# Patient Record
Sex: FEMALE | Race: BLACK OR AFRICAN AMERICAN | Employment: FULL TIME | URBAN - METROPOLITAN AREA
[De-identification: names, ages, dates, MRNs, and addresses within clinical notes are randomized per-mention and may not be internally consistent; named-entity substitution may affect disease eponyms.]

---

## 2017-01-30 ENCOUNTER — OFFICE VISIT (OUTPATIENT)
Dept: FAMILY MEDICINE | Facility: CLINIC | Age: 47
End: 2017-01-30
Payer: COMMERCIAL

## 2017-01-30 VITALS — WEIGHT: 206 LBS | BODY MASS INDEX: 33.27 KG/M2 | SYSTOLIC BLOOD PRESSURE: 130 MMHG | DIASTOLIC BLOOD PRESSURE: 90 MMHG

## 2017-01-30 DIAGNOSIS — Z71.84 TRAVEL ADVICE ENCOUNTER: Primary | ICD-10-CM

## 2017-01-30 DIAGNOSIS — Z23 NEED FOR VACCINATION: ICD-10-CM

## 2017-01-30 PROCEDURE — 90472 IMMUNIZATION ADMIN EACH ADD: CPT | Mod: GA | Performed by: NURSE PRACTITIONER

## 2017-01-30 PROCEDURE — 99402 PREV MED CNSL INDIV APPRX 30: CPT | Mod: 25 | Performed by: NURSE PRACTITIONER

## 2017-01-30 PROCEDURE — 90471 IMMUNIZATION ADMIN: CPT | Mod: GA | Performed by: NURSE PRACTITIONER

## 2017-01-30 PROCEDURE — 90686 IIV4 VACC NO PRSV 0.5 ML IM: CPT | Mod: GA | Performed by: NURSE PRACTITIONER

## 2017-01-30 PROCEDURE — 90632 HEPA VACCINE ADULT IM: CPT | Mod: GA | Performed by: NURSE PRACTITIONER

## 2017-01-30 PROCEDURE — 90691 TYPHOID VACCINE IM: CPT | Mod: GA | Performed by: NURSE PRACTITIONER

## 2017-01-30 RX ORDER — ATOVAQUONE AND PROGUANIL HYDROCHLORIDE 250; 100 MG/1; MG/1
1 TABLET, FILM COATED ORAL DAILY
Qty: 15 TABLET | Refills: 0 | Status: SHIPPED | OUTPATIENT
Start: 2017-01-30 | End: 2017-08-04

## 2017-01-30 RX ORDER — CIPROFLOXACIN 500 MG/1
500 TABLET, FILM COATED ORAL 2 TIMES DAILY
Qty: 6 TABLET | Refills: 0 | Status: SHIPPED | OUTPATIENT
Start: 2017-01-30 | End: 2017-08-04

## 2017-01-30 NOTE — Clinical Note
Name of traveler:  Kayla Fuentes  YOB: 1970  Dates of travel; 4 February 2017- 9 February 2017  Country of Travel:  [place] to [facility] to [facility]                  Immunization Exemption Letter: Yellow Fever  The traveler named above is my patient and under my medical care and must be exempted from the requirement for yellow fever immunization. This exemption is valid only for the current trip (noted above).  The risk of the vaccination is greater than the risk of disease for this patient.         Skylar Atwood CNP

## 2017-01-30 NOTE — MR AVS SNAPSHOT
After Visit Summary   1/30/2017    Kayla Fuentes    MRN: [id number]           Patient Information     Date Of Birth          1970        Visit Information        Provider Department      1/30/2017 3:45 PM Skylar Atwood APRN CNP [facility]        Today's Diagnoses     Travel advice encounter    -  1     Need for vaccination           Care Instructions    Today January 30, 2017 you received the    Flu Vaccine    Hepatitis A Vaccine - Please return on 7/29/17 or later for your 2nd and final dose.    Typhoid - injectable. This vaccine is valid for two years.   .    These appointments can be made as a NURSE ONLY visit.    **It is very important for the vaccinations to be given on the scheduled day(s), this helps ensure you receive the full effectiveness of the vaccine.**    Please call [facility] with any questions [phone]    Thank you for visiting [facility]            Follow-ups after your visit        Who to contact     If you have questions or need follow up information about today's clinic visit or your schedule please contact [facility] directly at [phone].  Normal or non-critical lab and imaging results will be communicated to you by Tangible Playhart, letter or phone within 4 business days after the clinic has received the results. If you do not hear from us within 7 days, please contact the clinic through Tangible Playhart or phone. If you have a critical or abnormal lab result, we will notify you by phone as soon as possible.  Submit refill requests through [email or web address] or call your pharmacy and they will forward the refill request to us. Please allow 3 business days for your refill to be completed.          Additional Information About Your Visit        Tangible PlayharPongr Information     [email or web address] lets you send messages to your doctor, view your test results, renew your prescriptions, schedule appointments and more. To sign up, go to  "[email or web address] . Click on \"Log in\" on the left side of the screen, which will take you to the Welcome page. Then click on \"Sign up Now\" on the right side of the page.     You will be asked to enter the access code listed below, as well as some personal information. Please follow the directions to create your username and password.     Your access code is: [email or web address]  Expires: 2/15/2017  2:23 PM     Your access code will  in 90 days. If you need help or a new code, please call your [place] clinic or [phone].        Care EveryWhere ID     This is your Care EveryWhere ID. This could be used by other organizations to access your [place] medical records  [id number]         Blood Pressure from Last 3 Encounters:   17 130/90   16 122/87   16 116/80    Weight from Last 3 Encounters:   17 206 lb (93.441 kg)   16 200 lb (90.719 kg)   16 196 lb 8 oz (89.132 kg)              We Performed the Following     HC FLU VAC PRESRV FREE QUAD SPLIT VIR 3+YRS IM     HEPA VACCINE ADULT IM     TYPHOID VACCINE, IM          Today's Medication Changes          These changes are accurate as of: 17  4:13 PM.  If you have any questions, ask your nurse or doctor.               Start taking these medicines.        Dose/Directions    atovaquone-proguanil 250-100 MG per tablet   Commonly known as:  MALARONE   Used for:  Need for vaccination, Travel advice encounter   Started by:  Skylar Atwood APRN CNP        Dose:  1 tablet   Take 1 tablet by mouth daily Start 2 days before exposure to Malaria and continue daily till  7 days after exposure.   Quantity:  15 tablet   Refills:  0       ciprofloxacin 500 MG tablet   Commonly known as:  CIPRO   Used for:  Travel advice encounter   Started by:  Skylar Atwood APRN CNP        Dose:  500 mg   Take 1 tablet (500 mg) by mouth 2 times daily Take 1 tablet two times a day for up to 3 days for severe diarrhea during travel.   Quantity:  6 " tablet   Refills:  0            Where to get your medicines      These medications were sent to [facility]/pharmacy #[id number] - [place], [place] - [address] [place] AT [facility] OF [facility]  [address] [facility] [address]     Phone:  [phone]    - atovaquone-proguanil 250-100 MG per tablet  - ciprofloxacin 500 MG tablet             Primary Care Provider Office Phone # Fax #    Jassi Washington [phone]       [facility] [address]  [facility] [address]        Thank you!     Thank you for choosing [facility]  for your care. Our goal is always to provide you with excellent care. Hearing back from our patients is one way we can continue to improve our services. Please take a few minutes to complete the written survey that you may receive in the mail after your visit with us. Thank you!             Your Updated Medication List - Protect others around you: Learn how to safely use, store and throw away your medicines at www.disposemymeds.org.          This list is accurate as of: 1/30/17  4:13 PM.  Always use your most recent med list.                   Brand Name Dispense Instructions for use    acetaminophen-caffeine 500-65 MG Tabs    EXCEDRIN TENSION HEADACHE     Take 2 tablets by mouth every 6 hours as needed for mild pain       atovaquone-proguanil 250-100 MG per tablet    MALARONE    15 tablet    Take 1 tablet by mouth daily Start 2 days before exposure to Malaria and continue daily till  7 days after exposure.       ciprofloxacin 500 MG tablet    CIPRO    6 tablet    Take 1 tablet (500 mg) by mouth 2 times daily Take 1 tablet two times a day for up to 3 days for severe diarrhea during travel.       HYDROcodone-acetaminophen 7.5-325 MG per tablet    NORCO     Take 1 tablet by mouth 2 times daily       * IMITREX PO      Take 50 mg by mouth at onset of headache for migraine       * SUMAtriptan 50 MG tablet    IMITREX    9 tablet    Take 1 tablet  (50 mg) by mouth at onset of headache for migraine May repeat dose in 2 hours.  Do not exceed 200 mg in 24 hours       * Notice:  This list has 2 medication(s) that are the same as other medications prescribed for you. Read the directions carefully, and ask your doctor or other care provider to review them with you.

## 2017-01-30 NOTE — NURSING NOTE
"Chief Complaint   Patient presents with     Travel Clinic     initial /90 mmHg  Wt 206 lb (93.441 kg) Estimated body mass index is 33.27 kg/(m^2) as calculated from the following:    Height as of 11/17/16: 5' 6\" (1.676 m).    Weight as of this encounter: 206 lb (93.441 kg).  BP completed using cuff size: large.  L  arm      Health Maintenance that is potentially due pending provider review:  NONE    n/a    Randy Masters ma  "

## 2017-01-30 NOTE — PROGRESS NOTES
Nurse Note      Itinerary:  [facility]      Departure Date: 2/4/17      Return Date: 2/8/17      Length of Trip 4 days      Reason for Travel: Business           Urban or rural: urban      Accommodations: Hotel        IMMUNIZATION HISTORY  Have you received any immunizations within the past 4 weeks?  No  Have you ever fainted from having your blood drawn or from an injection?  No  Have you ever had a fever reaction to vaccination?  No  Have you ever had any bad reaction or side effect from any vaccination?  No  Have you ever had hepatitis A or B vaccine?  No  Do you live (or work closely) with anyone who has AIDS, an AIDS-like condition, any other immune disorder or who is on chemotherapy for cancer?  No  Do you have a family history of immunodeficiency?  No  Have you received any injection of immune globulin or any blood products during the past 12 months?  No    Patient roomed by Randy Kaiser  Kayla Fuentes is a 46 year old female seen today alone for counsultation for international travel to [facility] for Business.  Patient will be departing in  4 day(s) and staying for   5 day(s) and  traveling with co-worker(s).      Patient itinerary :  will be in the urban region of [facility] which presents risk for Malaria, Dengue Fever, Chikungungya, Zika, Rabies, food borne illnesses, motor vehicle accidents and Typhoid. exposure.      Patient's activities will include business.    Patient[place] birth [place]    Special medical concerns: obesity and hypertension  Pre-travel questionnaire was completed by patient and reviewed by provider.     Vitals: /90 mmHg  Wt 206 lb (93.441 kg)  BMI= Body mass index is 33.27 kg/(m^2).    EXAM:  General:  Well-nourished, well-developed in no acute distress.  Appears to be stated age, interacts appropriately and expresses understanding of information given to patient.    Current Outpatient Prescriptions   Medication Sig Dispense Refill     SUMAtriptan (IMITREX)  50 MG tablet Take 1 tablet (50 mg) by mouth at onset of headache for migraine May repeat dose in 2 hours.  Do not exceed 200 mg in 24 hours 9 tablet 1     HYDROcodone-acetaminophen (NORCO) 7.5-325 MG per tablet Take 1 tablet by mouth 2 times daily       acetaminophen-caffeine (EXCEDRIN TENSION HEADACHE) 500-65 MG TABS Take 2 tablets by mouth every 6 hours as needed for mild pain       SUMAtriptan Succinate (IMITREX PO) Take 50 mg by mouth at onset of headache for migraine       Patient Active Problem List   Diagnosis     Hypertension goal BP (blood pressure) < 140/90     Intractable migraine with aura without status migrainosus     Chronic low back pain     CARDIOVASCULAR SCREENING; LDL GOAL LESS THAN 160     TMJ (temporomandibular joint syndrome)     Obesity (BMI 30-39.9)     Microalbuminuria     Acute right flank pain     History of renal calculi     Allergies   Allergen Reactions     Sulfa Drugs GI Disturbance         Immunizations discussed include:   Hepatitis A:  Ordered/given today, risks, benefits and side effects reviewed  Hepatitis B: Insufficient time to vaccinate  Influenza: Ordered/given today, risks, benefits and side effects reviewed  Typhoid: Ordered/given today, risks, benefits and side effects reviewed  Rabies: Insufficient time to vaccinate  Yellow Fever: low risk waiver given  Japanese Encephalitis: Not indicated  Meningococcus: Not indicated  Tetanus/Diphtheria: Up to date  Measles/Mumps/Rubella: Up to date  Cholera: Not needed  Polio: Up to date  Pneumococcal: Under age of 65  Varicella: Immune by disease history per patient report  Zostavax:  Not indicated  HPV:  Not indicated  TB:  Low risk    Altitude Exposure on this trip: no    ASSESSMENT/PLAN:    ICD-10-CM    1. Travel advice encounter Z71.89 HEPA VACCINE ADULT IM     TYPHOID VACCINE, IM     HC FLU VAC PRESRV FREE QUAD SPLIT VIR 3+YRS IM     atovaquone-proguanil (MALARONE) 250-100 MG per tablet     ciprofloxacin (CIPRO) 500 MG tablet   2.  Need for vaccination Z23 HEPA VACCINE ADULT IM     TYPHOID VACCINE, IM     HC FLU VAC PRESRV FREE QUAD SPLIT VIR 3+YRS IM     atovaquone-proguanil (MALARONE) 250-100 MG per tablet     I have reviewed general recommendations for safe travel   including: food/water precautions, insect precautions, safer sex   practices given high prevalence of Zika, HIV and other STDs,   roadway safety. Educational materials and [employer] report provided.    Malaraia prophylaxis recommended: Malarone  Symptomatic treatment for traveler's diarrhea: ciprofloxacin  Altitude illness prevention and treatment: no      Evacuation insurance advised and resources were provided to patient.    Total visit time 30 minutes  with over 50% of time spent counseling patient as detailed above.    Skylar Atwood CNP

## 2017-01-30 NOTE — PATIENT INSTRUCTIONS
Today January 30, 2017 you received the    Flu Vaccine    Hepatitis A Vaccine - Please return on 7/29/17 or later for your 2nd and final dose.    Typhoid - injectable. This vaccine is valid for two years.   .    These appointments can be made as a NURSE ONLY visit.    **It is very important for the vaccinations to be given on the scheduled day(s), this helps ensure you receive the full effectiveness of the vaccine.**    Please call [facility] with any questions [phone]    Thank you for visiting [facility]

## 2017-01-31 DIAGNOSIS — G43.119 INTRACTABLE MIGRAINE WITH AURA WITHOUT STATUS MIGRAINOSUS: Primary | ICD-10-CM

## 2017-01-31 NOTE — Clinical Note
February 10, 2017      Kayla Fuentes  [address] [address] [place] [id number]         Dear Kayla Fuentes,      The refill request made by your pharmacy was received at the clinic.     Signed Prescriptions:                        Disp   Refills    SUMAtriptan (IMITREX) 50 MG tablet         9 tabl*0        Sig: TAKE 1 TABLET BY MOUTH AT ONSET OF MIGRAINE. MAY           REPEAT DOSE IN 2 HRS. *MAX 200MG/24 HRS*  Authorizing Provider: LILA MARCANO    At this time you are due in the clinic for a follow up appointment for a physical and medication recheck in March 2017. A one time dario refill has been sent to your pharmacy.     Please call your clinic to make an appointment with your provider before you run out of medication. This will prevent a delay in your next month's refill.    [facility]- [phone]    For your convenience we also offer online appointment scheduling at "[email or web address] or [email or web address].     We invite you to refill your next prescription at a [facility] or take advantage of our prescription mail service.    Sincerely,    Sumeet Henry CMA

## 2017-02-08 RX ORDER — SUMATRIPTAN 50 MG/1
TABLET, FILM COATED ORAL
Qty: 9 TABLET | Refills: 0 | Status: SHIPPED | OUTPATIENT
Start: 2017-02-08 | End: 2017-08-04

## 2017-02-08 NOTE — TELEPHONE ENCOUNTER
SUMAtriptan (IMITREX) 50 MG tablet      Last Written Prescription Date: 4/25/16  Last Fill Quantity: 9, # refills: 1  Last Office Visit with [facility], [facility] or [facility] prescribing provider: 4/25/16       BP Readings from Last 3 Encounters:   01/30/17 130/90   11/17/16 122/87   04/25/16 116/80

## 2017-02-09 NOTE — TELEPHONE ENCOUNTER
Patient Contact    Attempt # 1    Was call answered?  No.  Left message for patient stating prescription refill has been sent to the pharmacy. Advised patient to call clinic to schedule physical/med check appointment in March. Clinic number given.  Sumeet Henry, CMA

## 2017-02-09 NOTE — TELEPHONE ENCOUNTER
Attempt #2:  Left message for patient stating prescription has been sent to the pharmacy. Advised patient to call clinic to schedule annual physical/medication recheck appointment in 03/2017. Clinic number given.  Marta Joshua CMA

## 2017-03-15 ENCOUNTER — TRANSFERRED RECORDS (OUTPATIENT)
Dept: HEALTH INFORMATION MANAGEMENT | Facility: CLINIC | Age: 47
End: 2017-03-15

## 2017-03-15 LAB
HPV ABSTRACT: NORMAL
PAP-ABSTRACT: NORMAL

## 2017-06-14 ENCOUNTER — TRANSFERRED RECORDS (OUTPATIENT)
Dept: HEALTH INFORMATION MANAGEMENT | Facility: CLINIC | Age: 47
End: 2017-06-14

## 2017-08-04 ENCOUNTER — OFFICE VISIT (OUTPATIENT)
Dept: PEDIATRICS | Facility: CLINIC | Age: 47
End: 2017-08-04
Payer: COMMERCIAL

## 2017-08-04 VITALS
BODY MASS INDEX: 31.81 KG/M2 | WEIGHT: 197.9 LBS | SYSTOLIC BLOOD PRESSURE: 108 MMHG | DIASTOLIC BLOOD PRESSURE: 70 MMHG | TEMPERATURE: 98.5 F | HEART RATE: 75 BPM | HEIGHT: 66 IN | OXYGEN SATURATION: 98 %

## 2017-08-04 DIAGNOSIS — Z12.31 VISIT FOR SCREENING MAMMOGRAM: ICD-10-CM

## 2017-08-04 DIAGNOSIS — G43.119 INTRACTABLE MIGRAINE WITH AURA WITHOUT STATUS MIGRAINOSUS: ICD-10-CM

## 2017-08-04 DIAGNOSIS — Z00.00 ROUTINE GENERAL MEDICAL EXAMINATION AT A HEALTH CARE FACILITY: Primary | ICD-10-CM

## 2017-08-04 DIAGNOSIS — Z13.6 CARDIOVASCULAR SCREENING; LDL GOAL LESS THAN 160: ICD-10-CM

## 2017-08-04 DIAGNOSIS — Z13.29 SCREENING FOR THYROID DISORDER: ICD-10-CM

## 2017-08-04 DIAGNOSIS — M54.50 CHRONIC BILATERAL LOW BACK PAIN WITHOUT SCIATICA: ICD-10-CM

## 2017-08-04 DIAGNOSIS — R74.8 ELEVATED LIVER ENZYMES: ICD-10-CM

## 2017-08-04 DIAGNOSIS — R80.9 MICROALBUMINURIA: ICD-10-CM

## 2017-08-04 DIAGNOSIS — M26.609 TMJ (TEMPOROMANDIBULAR JOINT SYNDROME): ICD-10-CM

## 2017-08-04 DIAGNOSIS — E66.9 OBESITY (BMI 30-39.9): ICD-10-CM

## 2017-08-04 DIAGNOSIS — G89.29 CHRONIC BILATERAL LOW BACK PAIN WITHOUT SCIATICA: ICD-10-CM

## 2017-08-04 DIAGNOSIS — I10 HYPERTENSION GOAL BP (BLOOD PRESSURE) < 140/90: ICD-10-CM

## 2017-08-04 PROCEDURE — 99396 PREV VISIT EST AGE 40-64: CPT | Performed by: FAMILY MEDICINE

## 2017-08-04 RX ORDER — AMLODIPINE AND BENAZEPRIL HYDROCHLORIDE 5; 10 MG/1; MG/1
1 CAPSULE ORAL DAILY
Qty: 90 CAPSULE | Refills: 3 | Status: SHIPPED | OUTPATIENT
Start: 2017-08-04 | End: 2018-05-16

## 2017-08-04 RX ORDER — AMLODIPINE AND BENAZEPRIL HYDROCHLORIDE 5; 10 MG/1; MG/1
1 CAPSULE ORAL DAILY
COMMUNITY
Start: 2017-08-04 | End: 2017-08-04

## 2017-08-04 RX ORDER — TIMOLOL MALEATE 5 MG/ML
1 SOLUTION/ DROPS OPHTHALMIC DAILY
Refills: 3 | COMMUNITY
Start: 2017-07-20 | End: 2019-06-03

## 2017-08-04 RX ORDER — SUMATRIPTAN 50 MG/1
TABLET, FILM COATED ORAL
Qty: 9 TABLET | Refills: 3 | Status: SHIPPED | OUTPATIENT
Start: 2017-08-04 | End: 2018-11-26

## 2017-08-04 ASSESSMENT — PAIN SCALES - GENERAL: PAINLEVEL: SEVERE PAIN (7)

## 2017-08-04 NOTE — MR AVS SNAPSHOT
After Visit Summary   8/4/2017    Kayla Fuentes    MRN: [id number]           Patient Information     Date Of Birth          1970        Visit Information        Provider Department      8/4/2017 3:20 PM Jassi Washington MD [facility]        Today's Diagnoses     Routine general medical examination at a health care facility    -  1    Intractable migraine with aura without status migrainosus        Visit for screening mammogram        CARDIOVASCULAR SCREENING; LDL GOAL LESS THAN 160        TMJ (temporomandibular joint syndrome)        Hypertension goal BP (blood pressure) < 140/90        Obesity (BMI 30-39.9)        Microalbuminuria        Elevated liver enzymes          Care Instructions    ZAK form clinic [facility] for pap report  Schedule for fasting labs in 1-2 weeks  Schedule for mammogram in 11/2017    Preventive Health Recommendations  Female Ages 40 to 49    Yearly exam:     See your health care provider every year in order to  1. Review health changes.   2. Discuss preventive care.    3. Review your medicines if your doctor prescribed any.      Get a Pap test every three years (unless you have an abnormal result and your provider advises testing more often).      If you get Pap tests with HPV test, you only need to test every 5 years, unless you have an abnormal result. You do not need a Pap test if your uterus was removed (hysterectomy) and you have not had cancer.      You should be tested each year for STDs (sexually transmitted diseases), if you're at risk.       Ask your doctor if you should have a mammogram.      Have a colonoscopy (test for colon cancer) if someone in your family has had colon cancer or polyps before age 50.       Have a cholesterol test every 5 years.       Have a diabetes test (fasting glucose) after age 45. If you are at risk for diabetes, you should have this test every 3 years.    Shots: Get a flu shot each year. Get a tetanus shot every 10  years.     Nutrition:     Eat at least 5 servings of fruits and vegetables each day.    Eat whole-grain bread, whole-wheat pasta and brown rice instead of white grains and rice.    Talk to your provider about Calcium and Vitamin D.     Lifestyle    Exercise at least 150 minutes a week (an average of 30 minutes a day, 5 days a week). This will help you control your weight and prevent disease.    Limit alcohol to one drink per day.    No smoking.     Wear sunscreen to prevent skin cancer.    See your dentist every six months for an exam and cleaning.          Follow-ups after your visit        Future tests that were ordered for you today     Open Future Orders        Priority Expected Expires Ordered    MA Screening Digital Bilateral Routine  8/4/2018 8/4/2017            Who to contact     If you have questions or need follow up information about today's clinic visit or your schedule please contact [facility] directly at [phone].  Normal or non-critical lab and imaging results will be communicated to you by Tap.Mehart, letter or phone within 4 business days after the clinic has received the results. If you do not hear from us within 7 days, please contact the clinic through Tap.Mehart or phone. If you have a critical or abnormal lab result, we will notify you by phone as soon as possible.  Submit refill requests through [facility] or call your pharmacy and they will forward the refill request to us. Please allow 3 business days for your refill to be completed.          Additional Information About Your Visit        Tap.[employer] Information     [facility] is an electronic gateway that provides easy, online access to your medical records. With [facility], you can request a clinic appointment, read your test results, renew a prescription or communicate with your care team.     To sign up for [facility] visit the website at [email or web address]   You will be asked to enter the access code listed below, as well as  "some personal information. Please follow the directions to create your username and password.     Your access code is: [email or web address]  Expires: 2017  4:02 PM     Your access code will  in 90 days. If you need help or a new code, please contact your [facility] or call [phone] for assistance.        Care EveryWhere ID     This is your Care EveryWhere ID. This could be used by other organizations to access your [place] medical records  [id number]        Your Vitals Were     Pulse Temperature Height Last Period Pulse Oximetry BMI (Body Mass Index)    75 98.5  F (36.9  C) (Oral) 5' 6\" (1.676 m) 2017 (Exact Date) 98% 31.94 kg/m2       Blood Pressure from Last 3 Encounters:   17 108/70   17 130/90   16 122/87    Weight from Last 3 Encounters:   17 197 lb 14.4 oz (89.8 kg)   17 206 lb (93.4 kg)   16 200 lb (90.7 kg)                 Today's Medication Changes          These changes are accurate as of: 17  4:02 PM.  If you have any questions, ask your nurse or doctor.               These medicines have changed or have updated prescriptions.        Dose/Directions    SUMAtriptan 50 MG tablet   Commonly known as:  IMITREX   This may have changed:  See the new instructions.   Used for:  Intractable migraine with aura without status migrainosus   Changed by:  Jassi Washington MD        TAKE 1 TABLET BY MOUTH AT ONSET OF MIGRAINE. MAY REPEAT DOSE IN 2 HRS. *MAX 200MG/24 HRS*   Quantity:  9 tablet   Refills:  3            Where to get your medicines      These medications were sent to [facility]/pharmacy #[id number] - [place], [place] - [address] [place] [place] AT [address], [facility] [place] [address]     Phone:  [phone]     amLODIPine-benazepril 5-10 MG per capsule    SUMAtriptan 50 MG tablet                Primary Care Provider Office Phone # Fax #    Jassi Washington [phone]       [facility] " [facility] [address]  [facility] [address]        Equal Access to Services     PAULLILIANA ELIZA : Hadii aad ku hadnegritao Soomaali, waaxda luqadaha, qaybta kaalmada adeegyada, toño remediosin hayaaneha dongcole otero laRuhunter . So [facility] [phone].    ATENCIÓN: Si habla español, tiene a melo disposición servicios gratuitos de asistencia lingüística. Llame al [phone].    We comply with applicable federal civil rights laws and [place] laws. We do not discriminate on the basis of race, color, national origin, age, disability sex, sexual orientation or gender identity.            Thank you!     Thank you for choosing [facility]  for your care. Our goal is always to provide you with excellent care. Hearing back from our patients is one way we can continue to improve our services. Please take a few minutes to complete the written survey that you may receive in the mail after your visit with us. Thank you!             Your Updated Medication List - Protect others around you: Learn how to safely use, store and throw away your medicines at www.disposemymeds.org.          This list is accurate as of: 8/4/17  4:02 PM.  Always use your most recent med list.                   Brand Name Dispense Instructions for use Diagnosis    acetaminophen-caffeine 500-65 MG Tabs    EXCEDRIN TENSION HEADACHE     Take 2 tablets by mouth every 6 hours as needed for mild pain        amLODIPine-benazepril 5-10 MG per capsule    LOTREL    90 capsule    Take 1 capsule by mouth daily    Hypertension goal BP (blood pressure) < 140/90       BENADRYL PO      Take 25 mg by mouth daily as needed for allergies        HYDROcodone-acetaminophen 7.5-325 MG per tablet    NORCO     Take 1 tablet by mouth 2 times daily        SUMAtriptan 50 MG tablet    IMITREX    9 tablet    TAKE 1 TABLET BY MOUTH AT ONSET OF MIGRAINE. MAY REPEAT DOSE IN 2 HRS. *MAX 200MG/24 HRS*    Intractable migraine with aura without status migrainosus       VIENVA  0.1-20 MG-MCG per tablet   Generic drug:  levonorgestrel-ethinyl estradiol      Take 1 tablet by mouth daily

## 2017-08-04 NOTE — Clinical Note
Please abstract the following data from this visit with this patient into the appropriate field in Epic:  Pap smear done on this date: 7/2017 (approximately), by this group: christy edwards, results were normal.

## 2017-08-04 NOTE — PATIENT INSTRUCTIONS
[facility] for pap report  Schedule for fasting labs in 1-2 weeks  Schedule for mammogram in 11/2017    Preventive Health Recommendations  Female Ages 40 to 49    Yearly exam:     See your health care provider every year in order to  1. Review health changes.   2. Discuss preventive care.    3. Review your medicines if your doctor prescribed any.      Get a Pap test every three years (unless you have an abnormal result and your provider advises testing more often).      If you get Pap tests with HPV test, you only need to test every 5 years, unless you have an abnormal result. You do not need a Pap test if your uterus was removed (hysterectomy) and you have not had cancer.      You should be tested each year for STDs (sexually transmitted diseases), if you're at risk.       Ask your doctor if you should have a mammogram.      Have a colonoscopy (test for colon cancer) if someone in your family has had colon cancer or polyps before age 50.       Have a cholesterol test every 5 years.       Have a diabetes test (fasting glucose) after age 45. If you are at risk for diabetes, you should have this test every 3 years.    Shots: Get a flu shot each year. Get a tetanus shot every 10 years.     Nutrition:     Eat at least 5 servings of fruits and vegetables each day.    Eat whole-grain bread, whole-wheat pasta and brown rice instead of white grains and rice.    Talk to your provider about Calcium and Vitamin D.     Lifestyle    Exercise at least 150 minutes a week (an average of 30 minutes a day, 5 days a week). This will help you control your weight and prevent disease.    Limit alcohol to one drink per day.    No smoking.     Wear sunscreen to prevent skin cancer.    See your dentist every six months for an exam and cleaning.

## 2017-08-04 NOTE — NURSING NOTE
"Chief Complaint   Patient presents with     Physical       Initial /70  Pulse 75  Temp 98.5  F (36.9  C) (Oral)  Ht 5' 6\" (1.676 m)  Wt 197 lb 14.4 oz (89.8 kg)  LMP 08/03/2017 (Exact Date)  SpO2 98%  BMI 31.94 kg/m2 Estimated body mass index is 31.94 kg/(m^2) as calculated from the following:    Height as of this encounter: 5' 6\" (1.676 m).    Weight as of this encounter: 197 lb 14.4 oz (89.8 kg).  BP completed using cuff size: karen Henry CMA    "

## 2017-08-04 NOTE — PROGRESS NOTES
SUBJECTIVE:   CC: Kayla Fuentes is an 46 year old woman who presents for preventive health visit.     Healthy Habits:    Do you get at least three servings of calcium containing foods daily (dairy, green leafy vegetables, etc.)? yes    Amount of exercise or daily activities, outside of work: 3 day(s) per week    Problems taking medications regularly No    Medication side effects: No    Have you had an eye exam in the past two years? yes    Do you see a dentist twice per year? yes    Do you have sleep apnea, excessive snoring or daytime drowsiness?no          Hypertension Follow-up      Outpatient blood pressures are not being checked.    Low Salt Diet: no added salt    Migraine Follow-Up    Headaches symptoms:  Stable     Frequency: once a month     Duration of headaches: hours    Able to do normal daily activities/work with migraines: Yes    Rescue/Relief medication:sumatriptan (Imitrex)              Effectiveness: moderate relief    Preventative medication: None    Neurologic complications: No new stroke-like symptoms, loss of vision or speech, numbness or weakness    In the past 4 weeks, how often have you gone to Urgent Care or the emergency room because of your headaches?  0            Today's PHQ-2 Score:   PHQ-2 ( 1999 Pf[employer]) 8/4/2017 11/17/2016   Q1: Little interest or pleasure in doing things 0 0   Q2: Feeling down, depressed or hopeless 0 0   PHQ-2 Score 0 0         Abuse: Current or Past(Physical, Sexual or Emotional)- No  Do you feel safe in your environment - Yes  Social History   Substance Use Topics     Smoking status: Former Smoker     Quit date: 3/15/2015     Smokeless tobacco: Never Used     Alcohol use Yes     The patient does not drink >3 drinks per day nor >7 drinks per week.    Reviewed orders with patient.  Reviewed health maintenance and updated orders accordingly - Yes  Labs reviewed in EPIC  BP Readings from Last 3 Encounters:   08/04/17 108/70   01/30/17 130/90   11/17/16 122/87    Wt  Readings from Last 3 Encounters:   08/04/17 197 lb 14.4 oz (89.8 kg)   01/30/17 206 lb (93.4 kg)   11/17/16 200 lb (90.7 kg)                  Patient Active Problem List   Diagnosis     Hypertension goal BP (blood pressure) < 140/90     Intractable migraine with aura without status migrainosus     Chronic low back pain     CARDIOVASCULAR SCREENING; LDL GOAL LESS THAN 160     TMJ (temporomandibular joint syndrome)     Obesity (BMI 30-39.9)     Microalbuminuria     Acute right flank pain     History of renal calculi     Elevated liver enzymes     History reviewed. No pertinent surgical history.    Social History   Substance Use Topics     Smoking status: Former Smoker     Quit date: 3/15/2015     Smokeless tobacco: Never Used     Alcohol use Yes     History reviewed. No pertinent family history.      Current Outpatient Prescriptions   Medication Sig Dispense Refill     VIENVA 0.1-20 MG-MCG per tablet Take 1 tablet by mouth daily  3     DiphenhydrAMINE HCl (BENADRYL PO) Take 25 mg by mouth daily as needed for allergies       SUMAtriptan (IMITREX) 50 MG tablet TAKE 1 TABLET BY MOUTH AT ONSET OF MIGRAINE. MAY REPEAT DOSE IN 2 HRS. *MAX 200MG/24 HRS* 9 tablet 3     amLODIPine-benazepril (LOTREL) 5-10 MG per capsule Take 1 capsule by mouth daily 90 capsule 3     acetaminophen-caffeine (EXCEDRIN TENSION HEADACHE) 500-65 MG TABS Take 2 tablets by mouth every 6 hours as needed for mild pain       [DISCONTINUED] amLODIPine-benazepril (LOTREL) 5-10 MG per capsule Take 1 capsule by mouth daily       HYDROcodone-acetaminophen (NORCO) 7.5-325 MG per tablet Take 1 tablet by mouth 2 times daily       Allergies   Allergen Reactions     Sulfa Drugs GI Disturbance     Recent Labs   Lab Test  11/17/16   1427  04/25/16   0738 02/27/14 04/16/12   LDL   --   148*  156   --    --    HDL   --   44*  50   --    --    TRIG   --   114  90   --    --    ALT  80*   --   11   --    --    CR  0.98  0.91  1.00   < >   --    GFRESTIMATED  61  67  69  "  < >   --    GFRESTBLACK  74  81  80   < >   --    POTASSIUM  4.4  4.0  4.0   < >   --    TSH   --    --   2.15   --   2.13    < > = values in this interval not displayed.              Patient under age 50, mutual decision reflected in health maintenance.        Pertinent mammograms are reviewed under the imaging tab.  History of abnormal Pap smear: NO - age 30- 65 PAP every 3 years recommended    Reviewed and updated as needed this visit by clinical staffTobacco  Allergies  Meds  Med Hx  Surg Hx  Fam Hx  Soc Hx        Reviewed and updated as needed this visit by Provider          History reviewed. No pertinent past medical history.   History reviewed. No pertinent surgical history.  Obstetric History     No data available            ROS:  C: NEGATIVE for fever, chills, change in weight  I: NEGATIVE for worrisome rashes, moles or lesions  E: NEGATIVE for vision changes or irritation  ENT: NEGATIVE for ear, mouth and throat problems  R: NEGATIVE for significant cough or SOB  B: NEGATIVE for masses, tenderness or discharge  CV: NEGATIVE for chest pain, palpitations or peripheral edema  CV: Hx HTN  GI: NEGATIVE for nausea, abdominal pain, heartburn, or change in bowel habits  : NEGATIVE for unusual urinary or vaginal symptoms. Periods are regular.  MUSCULOSKELETAL:History of chronic low back pain  N: NEGATIVE for weakness, dizziness or paresthesias  NEURO: Hx headaches-migraine  E: NEGATIVE for temperature intolerance, skin/hair changes  H: NEGATIVE for bleeding problems  P: NEGATIVE for changes in mood or affect    OBJECTIVE:   /70  Pulse 75  Temp 98.5  F (36.9  C) (Oral)  Ht 5' 6\" (1.676 m)  Wt 197 lb 14.4 oz (89.8 kg)  LMP 08/03/2017 (Exact Date)  SpO2 98%  BMI 31.94 kg/m2  EXAM:  GENERAL: healthy, alert and no distress  EYES: Eyes grossly normal to inspection, PERRL and conjunctivae and sclerae normal  HENT: ear canals and TM's normal, nose and mouth without ulcers or lesions  NECK: no " adenopathy, no asymmetry, masses, or scars and thyroid normal to palpation  RESP: lungs clear to auscultation - no rales, rhonchi or wheezes  BREAST: Deferred, patient was just seen by Dr. West at [facility] 2 weeks ago  CV: regular rate and rhythm, normal S1 S2, no S3 or S4, no murmur, click or rub, no peripheral edema and peripheral pulses strong  ABDOMEN: soft, nontender, no hepatosplenomegaly, no masses and bowel sounds normal   (female): Deferred, patient was just seen by Dr. West at [facility] 2 weeks ago  MS: no gross musculoskeletal defects noted, no edema  SKIN: no suspicious lesions or rashes  NEURO: Normal strength and tone, mentation intact and speech normal  PSYCH: mentation appears normal, affect normal/bright    ASSESSMENT/PLAN:   1. Routine general medical examination at a health care facility  Discussed on regular exercises, daily calcium intake, healthy eating, self breast exams monthly and routine dental checks  - TSH with free T4 reflex; Future    2. Intractable migraine with aura without status migrainosus  stable, continue with Imitrex p.r.n., white potential triggers for migraine  - SUMAtriptan (IMITREX) 50 MG tablet; TAKE 1 TABLET BY MOUTH AT ONSET OF MIGRAINE. MAY REPEAT DOSE IN 2 HRS. *MAX 200MG/24 HRS*  Dispense: 9 tablet; Refill: 3    3. Visit for screening mammogram    - MA Screening Digital Bilateral; Future    4. CARDIOVASCULAR SCREENING; LDL GOAL LESS THAN 160    - **Lipid panel reflex to direct LDL FUTURE anytime; Future      5. TMJ (temporomandibular joint syndrome)  Continue TMJ rehabilitation exercises    6. Hypertension goal BP (blood pressure) < 140/90  BP Readings from Last 6 Encounters:   08/04/17 108/70   01/30/17 130/90   11/17/16 122/87   04/25/16 116/80   10/13/15 108/83   05/26/15 113/87     Blood pressure is at goal, continue with the Lotrel, Will follow low salt diet, weight loss and regular exercises.   recheck in one year or sooner if needed  -  "amLODIPine-benazepril (LOTREL) 5-10 MG per capsule; Take 1 capsule by mouth daily  Dispense: 90 capsule; Refill: 3  - **Comprehensive metabolic panel FUTURE anytime; Future  - **Albumin Random Urine Quant FUTURE anytime; Future    7. Obesity (BMI 30-39.9)  Emphasized on weight loss, portion control, low calorie and low fat diet, healthy eating, regular exercises.      8. Microalbuminuria  - **Albumin Random Urine Quant FUTURE anytime; Future    9. Elevated liver enzymes  We will recheck  Reviewed abdominal CT from 11/2016 showing normal gallbladder and a benign hepatic cyst  - **Comprehensive metabolic panel FUTURE anytime; Future    10. Screening for thyroid disorder    - TSH with free T4 reflex; Future    COUNSELING:   Reviewed preventive health counseling, as reflected in patient instructions  Special attention given to:        Regular exercise       Healthy diet/nutrition       Contraception       Family planning       Folic Acid Counseling       Osteoporosis Prevention/Bone Health    11.Chronic low back pain-patient is planning for lumbar fusion from her spinal surgeon   is currently on chronic narcotic for Pain management     reports that she quit smoking about 2 years ago. She has never used smokeless tobacco.    Estimated body mass index is 31.94 kg/(m^2) as calculated from the following:    Height as of this encounter: 5' 6\" (1.676 m).    Weight as of this encounter: 197 lb 14.4 oz (89.8 kg).   Weight management plan: Discussed healthy diet and exercise guidelines and patient will follow up in 6 months in clinic to re-evaluate.    Counseling Resources:  ATP IV Guidelines  Pooled Cohorts Equation Calculator  Breast Cancer Risk Calculator  FRAX Risk Assessment  ICSI Preventive Guidelines  Dietary Guidelines for Americans, 2010  USDA's MyPlate  ASA Prophylaxis  Lung CA Screening    Jassi Washington MD  [facility]  Chart documentation done in part with Dragon Voice recognition Software. " Although reviewed after completion, some word and grammatical error may remain.

## 2017-08-08 DIAGNOSIS — Z13.29 SCREENING FOR THYROID DISORDER: ICD-10-CM

## 2017-08-08 DIAGNOSIS — Z13.6 CARDIOVASCULAR SCREENING; LDL GOAL LESS THAN 160: ICD-10-CM

## 2017-08-08 DIAGNOSIS — R74.8 ELEVATED LIVER ENZYMES: ICD-10-CM

## 2017-08-08 DIAGNOSIS — I10 HYPERTENSION GOAL BP (BLOOD PRESSURE) < 140/90: ICD-10-CM

## 2017-08-08 DIAGNOSIS — Z00.00 ROUTINE GENERAL MEDICAL EXAMINATION AT A HEALTH CARE FACILITY: ICD-10-CM

## 2017-08-08 PROBLEM — E78.5 HYPERLIPIDEMIA LDL GOAL <160: Status: ACTIVE | Noted: 2017-08-08

## 2017-08-08 PROBLEM — R73.01 ELEVATED FASTING BLOOD SUGAR: Status: ACTIVE | Noted: 2017-08-08

## 2017-08-08 LAB
ALBUMIN SERPL-MCNC: 3.5 G/DL (ref 3.4–5)
ALP SERPL-CCNC: 60 U/L (ref 40–150)
ALT SERPL W P-5'-P-CCNC: 23 U/L (ref 0–50)
ANION GAP SERPL CALCULATED.3IONS-SCNC: 7 MMOL/L (ref 3–14)
AST SERPL W P-5'-P-CCNC: 9 U/L (ref 0–45)
BILIRUB SERPL-MCNC: 0.3 MG/DL (ref 0.2–1.3)
BUN SERPL-MCNC: 12 MG/DL (ref 7–30)
CALCIUM SERPL-MCNC: 8.7 MG/DL (ref 8.5–10.1)
CHLORIDE SERPL-SCNC: 104 MMOL/L (ref 94–109)
CHOLEST SERPL-MCNC: 246 MG/DL
CO2 SERPL-SCNC: 28 MMOL/L (ref 20–32)
CREAT SERPL-MCNC: 0.96 MG/DL (ref 0.52–1.04)
CREAT UR-MCNC: 252 MG/DL
GFR SERPL CREATININE-BSD FRML MDRD: 63 ML/MIN/1.7M2
GLUCOSE SERPL-MCNC: 105 MG/DL (ref 70–99)
HDLC SERPL-MCNC: 47 MG/DL
LDLC SERPL CALC-MCNC: 183 MG/DL
MICROALBUMIN UR-MCNC: 50 MG/L
MICROALBUMIN/CREAT UR: 19.8 MG/G CR (ref 0–25)
NONHDLC SERPL-MCNC: 199 MG/DL
POTASSIUM SERPL-SCNC: 4.1 MMOL/L (ref 3.4–5.3)
PROT SERPL-MCNC: 7.6 G/DL (ref 6.8–8.8)
SODIUM SERPL-SCNC: 139 MMOL/L (ref 133–144)
TRIGL SERPL-MCNC: 82 MG/DL
TSH SERPL DL<=0.005 MIU/L-ACNC: 0.88 MU/L (ref 0.4–4)

## 2017-08-08 PROCEDURE — 84443 ASSAY THYROID STIM HORMONE: CPT | Performed by: FAMILY MEDICINE

## 2017-08-08 PROCEDURE — 82043 UR ALBUMIN QUANTITATIVE: CPT | Performed by: FAMILY MEDICINE

## 2017-08-08 PROCEDURE — 80053 COMPREHEN METABOLIC PANEL: CPT | Performed by: FAMILY MEDICINE

## 2017-08-08 PROCEDURE — 80061 LIPID PANEL: CPT | Performed by: FAMILY MEDICINE

## 2017-08-08 PROCEDURE — 36415 COLL VENOUS BLD VENIPUNCTURE: CPT | Performed by: FAMILY MEDICINE

## 2017-08-08 NOTE — LETTER
August 8, 2017      Kayla Gina  [id number] [address] [place] [id number]        Dear Kayla,    Results for orders placed or performed in visit on 08/08/17   **Comprehensive metabolic panel FUTURE anytime   Result Value Ref Range    Sodium 139 133 - 144 mmol/L    Potassium 4.1 3.4 - 5.3 mmol/L    Chloride 104 94 - 109 mmol/L    Carbon Dioxide 28 20 - 32 mmol/L    Anion Gap 7 3 - 14 mmol/L    Glucose 105 (H) 70 - 99 mg/dL    Urea Nitrogen 12 7 - 30 mg/dL    Creatinine 0.96 0.52 - 1.04 mg/dL    GFR Estimate 63 >60 mL/min/1.7m2    GFR Estimate If Black 76 >60 mL/min/1.7m2    Calcium 8.7 8.5 - 10.1 mg/dL    Bilirubin Total 0.3 0.2 - 1.3 mg/dL    Albumin 3.5 3.4 - 5.0 g/dL    Protein Total 7.6 6.8 - 8.8 g/dL    Alkaline Phosphatase 60 40 - 150 U/L    ALT 23 0 - 50 U/L    AST 9 0 - 45 U/L   **Lipid panel reflex to direct LDL FUTURE anytime   Result Value Ref Range    Cholesterol 246 (H) <200 mg/dL    Triglycerides 82 <150 mg/dL    HDL Cholesterol 47 (L) >49 mg/dL    LDL Cholesterol Calculated 183 (H) <100 mg/dL    Non HDL Cholesterol 199 (H) <130 mg/dL   **Albumin Random Urine Quant FUTURE anytime   Result Value Ref Range    Creatinine Urine 252 mg/dL    Albumin Urine mg/L 50 mg/L    Albumin Urine mg/g Cr 19.80 0 - 25 mg/g Cr   TSH with free T4 reflex   Result Value Ref Range    TSH 0.88 0.40 - 4.00 mU/L         I have reviewed your recent laboratory tests.  I am pleased to report that the following tests were normal or unchanged:   Hemoglobin  Kidney Tests  Potassium  Sodium  Thyroid Function  White Blood Count    The following tests were abnormal:  Blood Sugar -105  Cholesterol Panel-significantly elevated cholesterol numbers, worse than before    I recommend the following:  Please start or continue with a low fat diet ( low cholesterol)  Please attempt to improve your diet even further.  Begin or continue a regular aerobic exercise program.  Increase frequency of exercise. ( This is the BEST way to increase  "\"good\" / HDL portion of cholesterol)  Please come in fasting for your lab work in 3 months. ( 8 hours or more, may drink water and take medications)  Please make an appointment to see me 1 to 2 days after lab tests are drawn.      If you have any problems or concerns, please call myself or my nurse at the number above.    Sincerely,    Jassi Washington MD    "

## 2017-10-18 ENCOUNTER — OFFICE VISIT (OUTPATIENT)
Dept: PEDIATRICS | Facility: CLINIC | Age: 47
End: 2017-10-18
Payer: COMMERCIAL

## 2017-10-18 VITALS
TEMPERATURE: 96.8 F | WEIGHT: 196 LBS | BODY MASS INDEX: 31.5 KG/M2 | HEIGHT: 66 IN | OXYGEN SATURATION: 98 % | SYSTOLIC BLOOD PRESSURE: 112 MMHG | HEART RATE: 89 BPM | DIASTOLIC BLOOD PRESSURE: 76 MMHG

## 2017-10-18 DIAGNOSIS — G43.119 INTRACTABLE MIGRAINE WITH AURA WITHOUT STATUS MIGRAINOSUS: ICD-10-CM

## 2017-10-18 DIAGNOSIS — Z01.818 PREOP GENERAL PHYSICAL EXAM: Primary | ICD-10-CM

## 2017-10-18 DIAGNOSIS — Z23 NEED FOR PROPHYLACTIC VACCINATION AND INOCULATION AGAINST INFLUENZA: ICD-10-CM

## 2017-10-18 DIAGNOSIS — I10 HYPERTENSION GOAL BP (BLOOD PRESSURE) < 140/90: ICD-10-CM

## 2017-10-18 DIAGNOSIS — M47.816 LUMBAR SPONDYLOSIS: ICD-10-CM

## 2017-10-18 LAB
ANION GAP SERPL CALCULATED.3IONS-SCNC: 9 MMOL/L (ref 3–14)
BASOPHILS # BLD AUTO: 0 10E9/L (ref 0–0.2)
BASOPHILS NFR BLD AUTO: 0.3 %
BUN SERPL-MCNC: 13 MG/DL (ref 7–30)
CALCIUM SERPL-MCNC: 9 MG/DL (ref 8.5–10.1)
CHLORIDE SERPL-SCNC: 103 MMOL/L (ref 94–109)
CO2 SERPL-SCNC: 25 MMOL/L (ref 20–32)
CREAT SERPL-MCNC: 1.03 MG/DL (ref 0.52–1.04)
DIFFERENTIAL METHOD BLD: NORMAL
EOSINOPHIL # BLD AUTO: 0.1 10E9/L (ref 0–0.7)
EOSINOPHIL NFR BLD AUTO: 1.1 %
ERYTHROCYTE [DISTWIDTH] IN BLOOD BY AUTOMATED COUNT: 13.2 % (ref 10–15)
GFR SERPL CREATININE-BSD FRML MDRD: 58 ML/MIN/1.7M2
GLUCOSE SERPL-MCNC: 81 MG/DL (ref 70–99)
HCT VFR BLD AUTO: 43 % (ref 35–47)
HGB BLD-MCNC: 14.6 G/DL (ref 11.7–15.7)
LYMPHOCYTES # BLD AUTO: 3.6 10E9/L (ref 0.8–5.3)
LYMPHOCYTES NFR BLD AUTO: 37.6 %
MCH RBC QN AUTO: 30.9 PG (ref 26.5–33)
MCHC RBC AUTO-ENTMCNC: 34 G/DL (ref 31.5–36.5)
MCV RBC AUTO: 91 FL (ref 78–100)
MONOCYTES # BLD AUTO: 0.6 10E9/L (ref 0–1.3)
MONOCYTES NFR BLD AUTO: 5.7 %
NEUTROPHILS # BLD AUTO: 5.3 10E9/L (ref 1.6–8.3)
NEUTROPHILS NFR BLD AUTO: 55.3 %
PLATELET # BLD AUTO: 335 10E9/L (ref 150–450)
POTASSIUM SERPL-SCNC: 3.7 MMOL/L (ref 3.4–5.3)
RBC # BLD AUTO: 4.73 10E12/L (ref 3.8–5.2)
SODIUM SERPL-SCNC: 137 MMOL/L (ref 133–144)
WBC # BLD AUTO: 9.6 10E9/L (ref 4–11)

## 2017-10-18 PROCEDURE — 85025 COMPLETE CBC W/AUTO DIFF WBC: CPT | Performed by: FAMILY MEDICINE

## 2017-10-18 PROCEDURE — 36415 COLL VENOUS BLD VENIPUNCTURE: CPT | Performed by: FAMILY MEDICINE

## 2017-10-18 PROCEDURE — 90471 IMMUNIZATION ADMIN: CPT | Performed by: FAMILY MEDICINE

## 2017-10-18 PROCEDURE — 90686 IIV4 VACC NO PRSV 0.5 ML IM: CPT | Performed by: FAMILY MEDICINE

## 2017-10-18 PROCEDURE — 99214 OFFICE O/P EST MOD 30 MIN: CPT | Mod: 25 | Performed by: FAMILY MEDICINE

## 2017-10-18 PROCEDURE — 80048 BASIC METABOLIC PNL TOTAL CA: CPT | Performed by: FAMILY MEDICINE

## 2017-10-18 NOTE — PATIENT INSTRUCTIONS
Get the labs today  Get the flu shot today    Before Your Surgery      Call your surgeon if there is any change in your health. This includes signs of a cold or flu (such as a sore throat, runny nose, cough, rash or fever).    Do not smoke, drink alcohol or take over the counter medicine (unless your surgeon or primary care doctor tells you to) for the 24 hours before and after surgery.    If you take prescribed drugs: Follow your doctor s orders about which medicines to take and which to stop until after surgery.    Eating and drinking prior to surgery: follow the instructions from your surgeon    Take a shower or bath the night before surgery. Use the soap your surgeon gave you to gently clean your skin. If you do not have soap from your surgeon, use your regular soap. Do not shave or scrub the surgery site.  Wear clean pajamas and have clean sheets on your bed.

## 2017-10-18 NOTE — PROGRESS NOTES
Injectable Influenza Immunization Documentation    1.  Is the person to be vaccinated sick today?   No    2. Does the person to be vaccinated have an allergy to a component   of the vaccine?   No    3. Has the person to be vaccinated ever had a serious reaction   to influenza vaccine in the past?   No    4. Has the person to be vaccinated ever had Guillain-Barré syndrome?   No    Form completed by Nicol Jean Baptiste RN   [facility]

## 2017-10-18 NOTE — PROGRESS NOTES
[address] [address]  [phone]  Dept: [phone]    PRE-OP EVALUATION:  Today's date: 10/18/2017    Kayla Fuentes (: 1970) presents for pre-operative evaluation assessment as requested by Dr. Barnes.  She requires evaluation and anesthesia risk assessment prior to undergoing surgery/procedure for treatment of Lumbar fusion .  Proposed procedure: Lumbar fusion    Date of Surgery/ Procedure: 11-15-17  Time of Surgery/ Procedure: 7:30am  Hospital/Surgical Facility: [facility]  Fax number for surgical facility: [phone]  Primary Physician: Jassi Washington  Type of Anesthesia Anticipated: General    Patient has a Health Care Directive or Living Will:  NO    1. NO - Do you have a history of heart attack, stroke, stent, bypass or surgery on an artery in the head, neck, heart or legs?  2. NO - Do you ever have any pain or discomfort in your chest?  3. NO - Do you have a history of  Heart Failure?  4. NO - Are you troubled by shortness of breath when: walking on the level, up a slight hill or at night?  5. NO - Do you currently have a cold, bronchitis or other respiratory infection?  6. NO - Do you have a cough, shortness of breath or wheezing?  7. NO - Do you sometimes get pains in the calves of your legs when you walk?  8. YES - Do you or anyone in your family have previous history of blood clots? Sister  9. NO - Do you or does anyone in your family have a serious bleeding problem such as prolonged bleeding following surgeries or cuts?  10. NO - Have you ever had problems with anemia or been told to take iron pills?  11. NO - Have you had any abnormal blood loss such as black, tarry or bloody stools, or abnormal vaginal bleeding?  12. NO - Have you ever had a blood transfusion?  13. NO - Have you or any of your relatives ever had problems with anesthesia?  14. NO - Do you have sleep apnea, excessive snoring or daytime  drowsiness?  15. NO - Do you have any prosthetic heart valves?  16. NO - Do you have prosthetic joints?  17. NO - Is there any chance that you may be pregnant?        HPI:                                                      Brief HPI related to upcoming procedure:   Patient is here for preoperative clearance for upcoming Lumbar procedure for chronic lumbar spondylosis with radiculopathy      See problem list for active medical problems.  Problems all longstanding and stable, except as noted/documented.  See ROS for pertinent symptoms related to these conditions.                                                                                                  .    MEDICAL HISTORY:                                                    Patient Active Problem List    Diagnosis Date Noted     Hyperlipidemia LDL goal <160 08/08/2017     Priority: Medium     Elevated fasting blood sugar 08/08/2017     Priority: Medium     Elevated liver enzymes 08/04/2017     Priority: Medium     Chronic bilateral low back pain without sciatica 08/04/2017     Priority: Medium     Acute right flank pain 11/17/2016     Priority: Medium     History of renal calculi 11/17/2016     Priority: Medium     Obesity (BMI 30-39.9) 04/25/2016     Priority: Medium     Microalbuminuria 04/25/2016     Priority: Medium     TMJ (temporomandibular joint syndrome) 10/13/2015     Priority: Medium     Hypertension goal BP (blood pressure) < 140/90 05/26/2015     Priority: Medium     Intractable migraine with aura without status migrainosus 05/26/2015     Priority: Medium     Chronic low back pain 05/26/2015     Priority: Medium      History reviewed. No pertinent past medical history.  History reviewed. No pertinent surgical history.  Current Outpatient Prescriptions   Medication Sig Dispense Refill     TIZANIDINE HCL PO Take 4 mg by mouth At Bedtime       VIENVA 0.1-20 MG-MCG per tablet Take 1 tablet by mouth daily  3     amLODIPine-benazepril (LOTREL) 5-10 MG  "per capsule Take 1 capsule by mouth daily 90 capsule 3     DiphenhydrAMINE HCl (BENADRYL PO) Take 25 mg by mouth daily as needed for allergies       SUMAtriptan (IMITREX) 50 MG tablet TAKE 1 TABLET BY MOUTH AT ONSET OF MIGRAINE. MAY REPEAT DOSE IN 2 HRS. *MAX 200MG/24 HRS* 9 tablet 3     HYDROcodone-acetaminophen (NORCO) 7.5-325 MG per tablet Take 1 tablet by mouth 2 times daily       acetaminophen-caffeine (EXCEDRIN TENSION HEADACHE) 500-65 MG TABS Take 2 tablets by mouth every 6 hours as needed for mild pain       OTC products: None, except as noted above    Allergies   Allergen Reactions     Sulfa Drugs GI Disturbance      Latex Allergy: NO    Social History   Substance Use Topics     Smoking status: Former Smoker     Quit date: 3/15/2015     Smokeless tobacco: Never Used     Alcohol use Yes     History   Drug Use No       REVIEW OF SYSTEMS:                                                    C: NEGATIVE for fever, chills, change in weight  I: NEGATIVE for worrisome rashes, moles or lesions  E: NEGATIVE for vision changes or irritation  E/M: NEGATIVE for ear, mouth and throat problems  R: NEGATIVE for significant cough or SOB  B: NEGATIVE for masses, tenderness or discharge  CV: NEGATIVE for chest pain, palpitations or peripheral edema  CV: Hx HTN  GI: NEGATIVE for nausea, abdominal pain, heartburn, or change in bowel habits  : NEGATIVE for frequency, dysuria, or hematuria  MUSCULOSKELETAL:as above  N: NEGATIVE for weakness, dizziness or paresthesias  History of migraines  E: NEGATIVE for temperature intolerance, skin/hair changes  H: NEGATIVE for bleeding problems  P: NEGATIVE for changes in mood or affect    EXAM:                                                    /76  Pulse 89  Temp 96.8  F (36  C) (Temporal)  Ht 5' 6\" (1.676 m)  Wt 196 lb (88.9 kg)  SpO2 98%  BMI 31.64 kg/m2    GENERAL APPEARANCE: healthy, alert and no distress     EYES: EOMI, PERRL     HENT: ear canals and TM's normal and nose " and mouth without ulcers or lesions     NECK: no adenopathy, no asymmetry, masses, or scars and thyroid normal to palpation     RESP: lungs clear to auscultation - no rales, rhonchi or wheezes     CV: regular rates and rhythm, normal S1 S2, no S3 or S4 and no murmur, click or rub     ABDOMEN:  soft, nontender, no HSM or masses and bowel sounds normal     MS: extremities normal- no gross deformities noted, no evidence of inflammation in joints, FROM in all extremities.     SKIN: no suspicious lesions or rashes     NEURO: Normal strength and tone, sensory exam grossly normal, mentation intact and speech normal     PSYCH: mentation appears normal. and affect normal/bright     LYMPHATICS: No axillary, cervical, or supraclavicular nodes    DIAGNOSTICS:                                                      Results for orders placed or performed in visit on 10/18/17   CBC with platelets and differential   Result Value Ref Range    WBC 9.6 4.0 - 11.0 10e9/L    RBC Count 4.73 3.8 - 5.2 10e12/L    Hemoglobin 14.6 11.7 - 15.7 g/dL    Hematocrit 43.0 35.0 - 47.0 %    MCV 91 78 - 100 fl    MCH 30.9 26.5 - 33.0 pg    MCHC 34.0 31.5 - 36.5 g/dL    RDW 13.2 10.0 - 15.0 %    Platelet Count 335 150 - 450 10e9/L    Diff Method Automated Method     % Neutrophils 55.3 %    % Lymphocytes 37.6 %    % Monocytes 5.7 %    % Eosinophils 1.1 %    % Basophils 0.3 %    Absolute Neutrophil 5.3 1.6 - 8.3 10e9/L    Absolute Lymphocytes 3.6 0.8 - 5.3 10e9/L    Absolute Monocytes 0.6 0.0 - 1.3 10e9/L    Absolute Eosinophils 0.1 0.0 - 0.7 10e9/L    Absolute Basophils 0.0 0.0 - 0.2 10e9/L   Basic metabolic panel  (Ca, Cl, CO2, Creat, Gluc, K, Na, BUN)   Result Value Ref Range    Sodium 137 133 - 144 mmol/L    Potassium 3.7 3.4 - 5.3 mmol/L    Chloride 103 94 - 109 mmol/L    Carbon Dioxide 25 20 - 32 mmol/L    Anion Gap 9 3 - 14 mmol/L    Glucose 81 70 - 99 mg/dL    Urea Nitrogen 13 7 - 30 mg/dL    Creatinine 1.03 0.52 - 1.04 mg/dL    GFR Estimate 58 (L)  >60 mL/min/1.7m2    GFR Estimate If Black 70 >60 mL/min/1.7m2    Calcium 9.0 8.5 - 10.1 mg/dL         Recent Labs   Lab Test  08/08/17   0729  11/17/16   1427   HGB   --   13.7   PLT   --   295   NA  139  137   POTASSIUM  4.1  4.4   CR  0.96  0.98        IMPRESSION:                                                    Reason for surgery/procedure: lumbar spondylosis with radiculopathy/lumbar spinal fusion, revision of previous lumbar surgery  Diagnosis/reason for consult: Preoperative clearance    ASSESSMENT / PLAN:  (Z01.818) Preop general physical exam  (primary encounter diagnosis)  Comment:   Plan: CBC with platelets and differential, Basic         metabolic panel  (Ca, Cl, CO2, Creat, Gluc, K,         Na, BUN)        Patient is cleared for the procedure    (M47.816) Lumbar spondylosis  Comment:   Plan: CBC with platelets and differential, Basic         metabolic panel  (Ca, Cl, CO2, Creat, Gluc, K,         Na, BUN)        as above      (I10) Hypertension goal BP (blood pressure) < 140/90  Comment:   Plan:   BP Readings from Last 6 Encounters:   10/18/17 112/76   08/04/17 108/70   01/30/17 130/90   11/17/16 122/87   04/25/16 116/80   10/13/15 108/83     Blood pressure is at goal, continue with Lotrel daily, Will follow low salt diet, weight loss and regular exercises.      (G43.119) Intractable migraine with aura without status migrainosus  Comment:   Plan: Continue with Imitrex p.r.n.        The proposed surgical procedure is considered INTERMEDIATE risk.    REVISED CARDIAC RISK INDEX  The patient has the following serious cardiovascular risks for perioperative complications such as (MI, PE, VFib and 3  AV Block):  No serious cardiac risks  INTERPRETATION: 0 risks: Class I (very low risk - 0.4% complication rate)    The patient has the following additional risks for perioperative complications:  No identified additional risks      ICD-10-CM    1. Preop general physical exam Z01.818 CBC with platelets and  differential     Basic metabolic panel  (Ca, Cl, CO2, Creat, Gluc, K, Na, BUN)   2. Lumbar spondylosis M47.816 CBC with platelets and differential     Basic metabolic panel  (Ca, Cl, CO2, Creat, Gluc, K, Na, BUN)   3. Hypertension goal BP (blood pressure) < 140/90 I10    4. Intractable migraine with aura without status migrainosus G43.119        RECOMMENDATIONS:                                                      --Consult hospital rounder / IM to assist post-op medical management    --Patient is to take all scheduled medications on the day of surgery EXCEPT for modifications listed below.    APPROVAL GIVEN to proceed with proposed procedure, without further diagnostic evaluation       Signed Electronically by: Jassi Washington MD    Copy of this evaluation report is provided to requesting physician.    [place] Preop Guidelines  Chart documentation done in part with Dragon Voice recognition Software. Although reviewed after completion, some word and grammatical error may remain.

## 2017-10-18 NOTE — NURSING NOTE
"Chief Complaint   Patient presents with     Pre-Op Exam       Initial /76  Pulse 89  Temp 96.8  F (36  C) (Temporal)  Ht 5' 6\" (1.676 m)  Wt 196 lb (88.9 kg)  SpO2 98%  BMI 31.64 kg/m2 Estimated body mass index is 31.64 kg/(m^2) as calculated from the following:    Height as of this encounter: 5' 6\" (1.676 m).    Weight as of this encounter: 196 lb (88.9 kg).  Medication Reconciliation: complete    "

## 2017-10-18 NOTE — MR AVS SNAPSHOT
After Visit Summary   10/18/2017    Kayla Fuentes    MRN: [id number]           Patient Information     Date Of Birth          1970        Visit Information        Provider Department      10/18/2017 3:50 PM Jassi Washington MD [facility]        Today's Diagnoses     Preop general physical exam    -  1    Lumbar spondylosis        Hypertension goal BP (blood pressure) < 140/90        Intractable migraine with aura without status migrainosus          Care Instructions      Get the labs today  Get the flu shot today    Before Your Surgery      Call your surgeon if there is any change in your health. This includes signs of a cold or flu (such as a sore throat, runny nose, cough, rash or fever).    Do not smoke, drink alcohol or take over the counter medicine (unless your surgeon or primary care doctor tells you to) for the 24 hours before and after surgery.    If you take prescribed drugs: Follow your doctor s orders about which medicines to take and which to stop until after surgery.    Eating and drinking prior to surgery: follow the instructions from your surgeon    Take a shower or bath the night before surgery. Use the soap your surgeon gave you to gently clean your skin. If you do not have soap from your surgeon, use your regular soap. Do not shave or scrub the surgery site.  Wear clean pajamas and have clean sheets on your bed.           Follow-ups after your visit        Who to contact     If you have questions or need follow up information about today's clinic visit or your schedule please contact [facility] directly at [phone].  Normal or non-critical lab and imaging results will be communicated to you by MyChart, letter or phone within 4 business days after the clinic has received the results. If you do not hear from us within 7 days, please contact the clinic through MyChart or phone. If you have a critical or abnormal lab result, we will  "notify you by phone as soon as possible.  Submit refill requests through [employer] or call your pharmacy and they will forward the refill request to us. Please allow 3 business days for your refill to be completed.          Additional Information About Your Visit        [employer] Information     [employer] is an electronic gateway that provides easy, online access to your medical records. With [employer], you can request a clinic appointment, read your test results, renew a prescription or communicate with your care team.     To sign up for [employer] visit the website at [email or web address]   You will be asked to enter the access code listed below, as well as some personal information. Please follow the directions to create your username and password.     Your access code is: [email or web address]  Expires: 2017  4:02 PM     Your access code will  in 90 days. If you need help or a new code, please contact your [facility] or call [phone] for assistance.        Care EveryWhere ID     This is your Care EveryWhere ID. This could be used by other organizations to access your [facility] medical records  [id number]        Your Vitals Were     Pulse Temperature Height Pulse Oximetry BMI (Body Mass Index)       89 96.8  F (36  C) (Temporal) 5' 6\" (1.676 m) 98% 31.64 kg/m2        Blood Pressure from Last 3 Encounters:   10/18/17 112/76   17 108/70   17 130/90    Weight from Last 3 Encounters:   10/18/17 196 lb (88.9 kg)   17 197 lb 14.4 oz (89.8 kg)   17 206 lb (93.4 kg)              We Performed the Following     Basic metabolic panel  (Ca, Cl, CO2, Creat, Gluc, K, Na, BUN)     CBC with platelets and differential        Primary Care Provider Office Phone # Fax #    Jassi Washington [phone] [address]  [facility] [address]        Equal Access to Services     CARLOS KAY AH: Hadii alex Arguelles, aniyah mir, qaybta " toño campuzanocole barfield. So [facility] [phone].    ATENCIÓN: Si darin torres, tiene a melo disposición servicios gratuitos de asistencia lingüística. Parviz al [phone].    We comply with applicable federal civil rights laws and [place] laws. We do not discriminate on the basis of race, color, national origin, age, disability, sex, sexual orientation, or gender identity.            Thank you!     Thank you for choosing [facility]  for your care. Our goal is always to provide you with excellent care. Hearing back from our patients is one way we can continue to improve our services. Please take a few minutes to complete the written survey that you may receive in the mail after your visit with us. Thank you!             Your Updated Medication List - Protect others around you: Learn how to safely use, store and throw away your medicines at www.disposemymeds.org.          This list is accurate as of: 10/18/17  4:23 PM.  Always use your most recent med list.                   Brand Name Dispense Instructions for use Diagnosis    acetaminophen-caffeine 500-65 MG Tabs    EXCEDRIN TENSION HEADACHE     Take 2 tablets by mouth every 6 hours as needed for mild pain        amLODIPine-benazepril 5-10 MG per capsule    LOTREL    90 capsule    Take 1 capsule by mouth daily    Hypertension goal BP (blood pressure) < 140/90       BENADRYL PO      Take 25 mg by mouth daily as needed for allergies        HYDROcodone-acetaminophen 7.5-325 MG per tablet    NORCO     Take 1 tablet by mouth 2 times daily        SUMAtriptan 50 MG tablet    IMITREX    9 tablet    TAKE 1 TABLET BY MOUTH AT ONSET OF MIGRAINE. MAY REPEAT DOSE IN 2 HRS. *MAX 200MG/24 HRS*    Intractable migraine with aura without status migrainosus       TIZANIDINE HCL PO      Take 4 mg by mouth At Bedtime        VIENVA 0.1-20 MG-MCG per tablet   Generic drug:  levonorgestrel-ethinyl estradiol      Take 1 tablet by  mouth daily

## 2017-10-18 NOTE — PROGRESS NOTES
Please inform Kayla Fuentes Also normal test results on CBC and hemoglobin, normal electrolytes, normal kidney functions except for slightly low GFR,   I will recheck this at her next visit in 6 months  Will need to stay away from NSAIDs  '

## 2017-10-19 ENCOUNTER — TELEPHONE (OUTPATIENT)
Dept: PEDIATRICS | Facility: CLINIC | Age: 47
End: 2017-10-19

## 2017-10-19 NOTE — TELEPHONE ENCOUNTER
Attempted to reach patient. LM for patient to return clinics call.    If patient returns clinics call and nurse not available please ask patient if we can leave a detailed message in the event we return call and do not reach her.    Thank you. Shante Madera RN

## 2017-10-19 NOTE — TELEPHONE ENCOUNTER
Basic metabolic panel  (Ca, Cl, CO2, Creat, Gluc, K, Na, BUN)   Status:  Final result   Visible to patient:  No (Not Released) Dx:  Preop general physical exam; Lumbar s... Order: [id number]       Notes Recorded by Jassi Washington MD on 10/18/2017 at 4:58 PM  Please inform Kayla Fuentes Also normal test results on CBC and hemoglobin, normal electrolytes, normal kidney functions except for slightly low GFR,   I will recheck this at her next visit in 6 months  Will need to stay away from NSAIDs  '           Ref Range & Units 1d ago   2mo ago   11mo ago        Sodium 133 - 144 mmol/L 137 139 137      Potassium 3.4 - 5.3 mmol/L 3.7 4.1 4.4      Chloride 94 - 109 mmol/L 103 104 105      Carbon Dioxide 20 - 32 mmol/L 25 28 27      Anion Gap 3 - 14 mmol/L 9 7 5      Glucose 70 - 99 mg/dL 81 105 (H)CM 92     Comments: Non Fasting      Urea Nitrogen 7 - 30 mg/dL 13 12 13      Creatinine 0.52 - 1.04 mg/dL 1.03 0.96 0.98      GFR Estimate >60 mL/min/1.7m2 58 (L) 63CM 61CM     Comments: Non  GFR Calc      GFR Estimate If Black >60 mL/min/1.7m2 70 76CM 74CM     Comments:  GFR Calc      Calcium 8.5 - 10.1 mg/dL 9.0 8.7 9.0     [facility]ing Agency  [facility]          Specimen Collected: 10/18/17  4:29 PM Last Resulted: 10/18/17  4:52 PM                CM=Additional comments                      CBC with platelets and differential   Status:  Final result   Visible to patient:  No (Not Released) Dx:  Preop general physical exam; Lumbar s... Order: [id number]       Notes Recorded by Jassi Washington MD on 10/18/2017 at 4:58 PM  Please inform Kayla Fuentes Also normal test results on CBC and hemoglobin, normal electrolytes, normal kidney functions except for slightly low GFR,   I will recheck this at her next visit in 6 months  Will need to stay away from NSAIDs  '           Ref Range & Units 1d ago   11mo ago        WBC 4.0 - 11.0 10e9/L 9.6 8.4      RBC Count 3.8 - 5.2 10e12/L 4.73 4.47       Hemoglobin 11.7 - 15.7 g/dL 14.6 13.7      Hematocrit 35.0 - 47.0 % 43.0 41.0      MCV 78 - 100 fl 91 92      MCH 26.5 - 33.0 pg 30.9 30.6      MCHC 31.5 - 36.5 g/dL 34.0 33.4      RDW 10.0 - 15.0 % 13.2 13.8      Platelet Count 150 - 450 10e9/L 335 295      Diff Method  Automated Method Automated Method      % Neutrophils % 55.3 55.9      % Lymphocytes % 37.6 33.7      % Monocytes % 5.7 6.8      % Eosinophils % 1.1 3.4      % Basophils % 0.3 0.2      Absolute Neutrophil 1.6 - 8.3 10e9/L 5.3 4.7      Absolute Lymphocytes 0.8 - 5.3 10e9/L 3.6 2.8      Absolute Monocytes 0.0 - 1.3 10e9/L 0.6 0.6      Absolute Eosinophils 0.0 - 0.7 10e9/L 0.1 0.3      Absolute Basophils 0.0 - 0.2 10e9/L 0.0 0.0     Resulting Agency  [facility]          Specimen Collected: 10/18/17  4:29 PM Last Resulted: 10/18/17  4:39 PM                             Shante Madera RN

## 2017-10-19 NOTE — TELEPHONE ENCOUNTER
Patient is returning clinics phone call. Patient states it is ok to leave a detailed message. Please advise

## 2017-10-19 NOTE — TELEPHONE ENCOUNTER
Left detailed message with information from Dr. Washington. Encouraged patient to return clinics call if she has further questions. Shante Madera RN

## 2017-11-15 ENCOUNTER — TRANSFERRED RECORDS (OUTPATIENT)
Dept: HEALTH INFORMATION MANAGEMENT | Facility: CLINIC | Age: 47
End: 2017-11-15

## 2017-12-28 ENCOUNTER — TRANSFERRED RECORDS (OUTPATIENT)
Dept: HEALTH INFORMATION MANAGEMENT | Facility: CLINIC | Age: 47
End: 2017-12-28

## 2018-01-21 ENCOUNTER — HEALTH MAINTENANCE LETTER (OUTPATIENT)
Age: 48
End: 2018-01-21

## 2018-02-22 ENCOUNTER — TRANSFERRED RECORDS (OUTPATIENT)
Dept: HEALTH INFORMATION MANAGEMENT | Facility: CLINIC | Age: 48
End: 2018-02-22

## 2018-04-18 ENCOUNTER — RADIANT APPOINTMENT (OUTPATIENT)
Dept: MAMMOGRAPHY | Facility: CLINIC | Age: 48
End: 2018-04-18
Attending: FAMILY MEDICINE
Payer: COMMERCIAL

## 2018-04-18 DIAGNOSIS — Z12.31 VISIT FOR SCREENING MAMMOGRAM: ICD-10-CM

## 2018-04-18 PROCEDURE — 77063 BREAST TOMOSYNTHESIS BI: CPT | Performed by: STUDENT IN AN ORGANIZED HEALTH CARE EDUCATION/TRAINING PROGRAM

## 2018-04-18 PROCEDURE — 77067 SCR MAMMO BI INCL CAD: CPT | Performed by: STUDENT IN AN ORGANIZED HEALTH CARE EDUCATION/TRAINING PROGRAM

## 2018-05-16 ENCOUNTER — OFFICE VISIT (OUTPATIENT)
Dept: PEDIATRICS | Facility: CLINIC | Age: 48
End: 2018-05-16
Payer: COMMERCIAL

## 2018-05-16 VITALS
SYSTOLIC BLOOD PRESSURE: 108 MMHG | DIASTOLIC BLOOD PRESSURE: 70 MMHG | OXYGEN SATURATION: 98 % | HEIGHT: 66 IN | HEART RATE: 75 BPM | TEMPERATURE: 99.9 F | BODY MASS INDEX: 30.87 KG/M2 | WEIGHT: 192.1 LBS

## 2018-05-16 DIAGNOSIS — Z98.1 STATUS POST LUMBAR SPINAL FUSION: ICD-10-CM

## 2018-05-16 DIAGNOSIS — E66.9 OBESITY (BMI 30-39.9): ICD-10-CM

## 2018-05-16 DIAGNOSIS — G43.119 INTRACTABLE MIGRAINE WITH AURA WITHOUT STATUS MIGRAINOSUS: ICD-10-CM

## 2018-05-16 DIAGNOSIS — I10 HYPERTENSION GOAL BP (BLOOD PRESSURE) < 140/90: Primary | ICD-10-CM

## 2018-05-16 PROCEDURE — 99214 OFFICE O/P EST MOD 30 MIN: CPT | Performed by: FAMILY MEDICINE

## 2018-05-16 RX ORDER — SUMATRIPTAN 100 MG/1
100 TABLET, FILM COATED ORAL
Qty: 9 TABLET | Refills: 2 | Status: SHIPPED | OUTPATIENT
Start: 2018-05-16 | End: 2019-03-04

## 2018-05-16 RX ORDER — AMLODIPINE AND BENAZEPRIL HYDROCHLORIDE 5; 10 MG/1; MG/1
1 CAPSULE ORAL DAILY
Qty: 90 CAPSULE | Refills: 3 | Status: SHIPPED | OUTPATIENT
Start: 2018-05-16 | End: 2018-11-26

## 2018-05-16 ASSESSMENT — PAIN SCALES - GENERAL: PAINLEVEL: MODERATE PAIN (5)

## 2018-05-16 NOTE — MR AVS SNAPSHOT
After Visit Summary   5/16/2018    Kayla Fuentes    MRN: [id number]           Patient Information     Date Of Birth          1970        Visit Information        Provider Department      5/16/2018 9:30 AM Jassi Washington MD [facility]        Today's Diagnoses     Hypertension goal BP (blood pressure) < 140/90    -  1    Intractable migraine with aura without status migrainosus          Care Instructions    Start on IMITREX 100 mg for migraine as needed  Start on CALTRATE-D twice daily    Schedule for fasting labs and physical in 6 months          Follow-ups after your visit        Follow-up notes from your care team     Return in about 6 months (around 11/16/2018) for Fasting lab work, Physical.      Who to contact     If you have questions or need follow up information about today's clinic visit or your schedule please contact [facility] directly at [phone].  Normal or non-critical lab and imaging results will be communicated to you by MyChart, letter or phone within 4 business days after the clinic has received the results. If you do not hear from us within 7 days, please contact the clinic through [employer] or phone. If you have a critical or abnormal lab result, we will notify you by phone as soon as possible.  Submit refill requests through [employer] or call your pharmacy and they will forward the refill request to us. Please allow 3 business days for your refill to be completed.          Additional Information About Your Visit        MyChart Information     [employer] is an electronic gateway that provides easy, online access to your medical records. With [employer], you can request a clinic appointment, read your test results, renew a prescription or communicate with your care team.     To sign up for [employer] visit the website at [email or web address]   You will be asked to enter the access code listed below, as well as some personal information.  "Please follow the directions to create your username and password.     Your access code is: [email or web address]  Expires: 2018  9:58 AM     Your access code will  in 90 days. If you need help or a new code, please contact your [facility] or call [phone] for assistance.        Care EveryWhere ID     This is your Care EveryWhere ID. This could be used by other organizations to access your [facility] medical records  [id number]        Your Vitals Were     Pulse Temperature Height Pulse Oximetry BMI (Body Mass Index)       75 99.9  F (37.7  C) (Oral) 5' 6\" (1.676 m) 98% 31.01 kg/m2        Blood Pressure from Last 3 Encounters:   18 108/70   10/18/17 112/76   17 108/70    Weight from Last 3 Encounters:   18 192 lb 1.6 oz (87.1 kg)   10/18/17 196 lb (88.9 kg)   17 197 lb 14.4 oz (89.8 kg)              Today, you had the following     No orders found for display         Today's Medication Changes          These changes are accurate as of 18  9:58 AM.  If you have any questions, ask your nurse or doctor.               These medicines have changed or have updated prescriptions.        Dose/Directions    * SUMAtriptan 50 MG tablet   Commonly known as:  IMITREX   This may have changed:  Another medication with the same name was added. Make sure you understand how and when to take each.   Used for:  Intractable migraine with aura without status migrainosus   Changed by:  Jassi Washington MD        TAKE 1 TABLET BY MOUTH AT ONSET OF MIGRAINE. MAY REPEAT DOSE IN 2 HRS. *MAX 200MG/24 HRS*   Quantity:  9 tablet   Refills:  3       * SUMAtriptan 100 MG tablet   Commonly known as:  IMITREX   This may have changed:  You were already taking a medication with the same name, and this prescription was added. Make sure you understand how and when to take each.   Used for:  Intractable migraine with aura without status migrainosus   Changed by:  Jassi Washington MD    "     Dose:  100 mg   Take 1 tablet (100 mg) by mouth at onset of headache for migraine May repeat in 2 hours. Max 2 tablets/24 hours.   Quantity:  9 tablet   Refills:  2       * Notice:  This list has 2 medication(s) that are the same as other medications prescribed for you. Read the directions carefully, and ask your doctor or other care provider to review them with you.      Stop taking these medicines if you haven't already. Please contact your care team if you have questions.     HYDROcodone-acetaminophen 7.5-325 MG per tablet   Commonly known as:  NORCO   Stopped by:  Jassi Washington MD           TIZANIDINE HCL PO   Stopped by:  Jassi Washington MD                Where to get your medicines      These medications were sent to [facility]/pharmacy #[id number] - [facility] [address], [place] AT [facility]  [address] [facility] [address]     Phone:  [phone]     amLODIPine-benazepril 5-10 MG per capsule    SUMAtriptan 100 MG tablet               Information about OPIOIDS     PRESCRIPTION OPIOIDS: WHAT YOU NEED TO KNOW   You have a prescription for an opioid (narcotic) pain medicine. Opioids can cause addiction. If you have a history of chemical dependency of any type, you are at a higher risk of becoming addicted to opioids. Only take this medicine after all other options have been tried. Take it for as short a time and as few doses as possible.     Do not:    Drive. If you drive while taking these medicines, you could be arrested for driving under the influence (DUI).    Operate heavy machinery    Do any other dangerous activities while taking these medicines.     Drink any alcohol while taking these medicines.      Take with any other medicines that contain acetaminophen. Read all labels carefully. Look for the word  acetaminophen  or  Tylenol.  Ask your pharmacist if you have questions or are unsure.    Store your pills in a secure place, locked if possible. We will not  replace any lost or stolen medicine. If you don t finish your medicine, please throw away (dispose) as directed by your pharmacist. The [place] [employer]ution [employer] has more information about safe disposal: https://www.pca.[facility].[place].us/living-green/managing-unwanted-medications    All opioids tend to cause constipation. Drink plenty of water and eat foods that have a lot of fiber, such as fruits, vegetables, prune juice, apple juice and high-fiber cereal. Take a laxative (Miralax, milk of magnesia, Colace, Senna) if you don t move your bowels at least every other day.          Primary Care Provider Office Phone # Fax #    Jassi Washington [phone] [address] [facility] [address]        Equal Access to Services     CARLOS KAY : Mac duarte Sosantos, waaxda luqadaha, qaybta kaalmada soheila, toño heaton . So [facility] [place] [facility] [phone].    ATENCIÓN: Si habla español, tiene a melo disposición servicios gratuitos de asistencia lingüística. Parviz al [phone].    We comply with applicable federal civil rights laws and [place] laws. We do not discriminate on the basis of race, color, national origin, age, disability, sex, sexual orientation, or gender identity.            Thank you!     Thank you for choosing [facility]  for your care. Our goal is always to provide you with excellent care. Hearing back from our patients is one way we can continue to improve our services. Please take a few minutes to complete the written survey that you may receive in the mail after your visit with us. Thank you!             Your Updated Medication List - Protect others around you: Learn how to safely use, store and throw away your medicines at www.disposemymeds.org.          This list is accurate as of 5/16/18  9:58 AM.  Always use your most recent med list.                   Brand Name Dispense Instructions for use Diagnosis     amLODIPine-benazepril 5-10 MG per capsule    LOTREL    90 capsule    Take 1 capsule by mouth daily    Hypertension goal BP (blood pressure) < 140/90       BENADRYL PO      Take 25 mg by mouth daily as needed for allergies        FLEXERIL PO      Take 5 mg by mouth At Bedtime        GABAPENTIN PO      Take 100 mg by mouth 3 times daily        OXYCODONE HCL PO      Take 5 mg by mouth every 6 hours as needed        * SUMAtriptan 50 MG tablet    IMITREX    9 tablet    TAKE 1 TABLET BY MOUTH AT ONSET OF MIGRAINE. MAY REPEAT DOSE IN 2 HRS. *MAX 200MG/24 HRS*    Intractable migraine with aura without status migrainosus       * SUMAtriptan 100 MG tablet    IMITREX    9 tablet    Take 1 tablet (100 mg) by mouth at onset of headache for migraine May repeat in 2 hours. Max 2 tablets/24 hours.    Intractable migraine with aura without status migrainosus       TYLENOL ARTHRITIS PAIN PO      Take 1,300 mg by mouth 2 times daily        VIENVA 0.1-20 MG-MCG per tablet   Generic drug:  levonorgestrel-ethinyl estradiol      Take 1 tablet by mouth daily        * Notice:  This list has 2 medication(s) that are the same as other medications prescribed for you. Read the directions carefully, and ask your doctor or other care provider to review them with you.

## 2018-05-16 NOTE — PATIENT INSTRUCTIONS
Start on IMITREX 100 mg for migraine as needed  Start on CALTRATE-D twice daily    Schedule for fasting labs and physical in 6 months

## 2018-05-16 NOTE — PROGRESS NOTES
SUBJECTIVE:   Kayla Fuentes is a 47 year old female who presents to clinic today for the following health issues:      Hypertension Follow-up      Outpatient blood pressures are being checked at home.  Results are within range.    Low Salt Diet: low salt      Amount of exercise or physical activity: slowly getting back into routine after back surgery    Problems taking medications regularly: No    Medication side effects: none    Diet: low salt        Migraine Follow-Up    Headaches symptoms:  Stable , but needed to take at least 200 mg total of Imitrex within 24 hours for headache relief    Frequency: 1-2 times per month    Duration of headaches: Few hours to up to a day    Able to do normal daily activities/work with migraines: Yes    Rescue/Relief medication:sumatriptan (Imitrex)              Effectiveness: moderate relief    Preventative medication: None    Neurologic complications: No new stroke-like symptoms, loss of vision or speech, numbness or weakness    In the past 4 weeks, how often have you gone to Urgent Care or the emergency room because of your headaches?  0    S/P SPINAL FUSION-patient had lumbar spinal fusion a few months ago for her chronic low back pain from lumbar degenerative disc disease.  Is currently on gabapentin and oxycodone and Flexeril.  Patient was told by her spinal surgeon that her bone fusion is delayed, patient is currently taking vitamin D daily.      Problem list and histories reviewed & adjusted, as indicated.  Additional history: as documented    Patient Active Problem List   Diagnosis     Hypertension goal BP (blood pressure) < 140/90     Intractable migraine with aura without status migrainosus     Chronic low back pain     TMJ (temporomandibular joint syndrome)     Obesity (BMI 30-39.9)     Microalbuminuria     Acute right flank pain     History of renal calculi     Elevated liver enzymes     Chronic bilateral low back pain without sciatica     Hyperlipidemia LDL goal  <160     Elevated fasting blood sugar     History reviewed. No pertinent surgical history.    Social History   Substance Use Topics     Smoking status: Former Smoker     Quit date: 3/15/2015     Smokeless tobacco: Never Used     Alcohol use Yes     History reviewed. No pertinent family history.      Current Outpatient Prescriptions   Medication Sig Dispense Refill     Acetaminophen (TYLENOL ARTHRITIS PAIN PO) Take 1,300 mg by mouth 2 times daily       amLODIPine-benazepril (LOTREL) 5-10 MG per capsule Take 1 capsule by mouth daily 90 capsule 3     Cyclobenzaprine HCl (FLEXERIL PO) Take 5 mg by mouth At Bedtime       DiphenhydrAMINE HCl (BENADRYL PO) Take 25 mg by mouth daily as needed for allergies       GABAPENTIN PO Take 100 mg by mouth 3 times daily       OXYCODONE HCL PO Take 5 mg by mouth every 6 hours as needed       SUMAtriptan (IMITREX) 100 MG tablet Take 1 tablet (100 mg) by mouth at onset of headache for migraine May repeat in 2 hours. Max 2 tablets/24 hours. 9 tablet 2     SUMAtriptan (IMITREX) 50 MG tablet TAKE 1 TABLET BY MOUTH AT ONSET OF MIGRAINE. MAY REPEAT DOSE IN 2 HRS. *MAX 200MG/24 HRS* 9 tablet 3     VIENVA 0.1-20 MG-MCG per tablet Take 1 tablet by mouth daily  3     [DISCONTINUED] amLODIPine-benazepril (LOTREL) 5-10 MG per capsule Take 1 capsule by mouth daily 90 capsule 3     Allergies   Allergen Reactions     Sulfa Drugs GI Disturbance     Recent Labs   Lab Test  10/18/17   1629  08/08/17   0729  11/17/16   1427  04/25/16   0738 02/27/14   LDL   --   183*   --   148*  156   HDL   --   47*   --   44*  50   TRIG   --   82   --   114  90   ALT   --   23  80*   --   11   CR  1.03  0.96  0.98  0.91  1.00   GFRESTIMATED  58*  63  61  67  69   GFRESTBLACK  70  76  74  81  80   POTASSIUM  3.7  4.1  4.4  4.0  4.0   TSH   --   0.88   --    --   2.15      BP Readings from Last 3 Encounters:   05/16/18 108/70   10/18/17 112/76   08/04/17 108/70    Wt Readings from Last 3 Encounters:   05/16/18 192 lb 1.6  "oz (87.1 kg)   10/18/17 196 lb (88.9 kg)   08/04/17 197 lb 14.4 oz (89.8 kg)                  Labs reviewed in EPIC    Reviewed and updated as needed this visit by clinical staff  Tobacco  Allergies  Meds  Med Hx  Surg Hx  Fam Hx  Soc Hx      Reviewed and updated as needed this visit by Provider         ROS:  CONSTITUTIONAL: NEGATIVE for fever, chills, change in weight  INTEGUMENTARY/SKIN: NEGATIVE for worrisome rashes, moles or lesions  EYES: NEGATIVE for vision changes or irritation  RESP: NEGATIVE for significant cough or SOB  CV: NEGATIVE for chest pain, palpitations or peripheral edema  CV: Hx HTN  GI: NEGATIVE for nausea, abdominal pain, heartburn, or change in bowel habits  MUSCULOSKELETAL: as above  NEURO: NEGATIVE for weakness, dizziness or paresthesias  History of migraines  ENDOCRINE: NEGATIVE for temperature intolerance, skin/hair changes  HEME/ALLERGY/IMMUNE: NEGATIVE for bleeding problems  PSYCHIATRIC: NEGATIVE for changes in mood or affect    OBJECTIVE:     /70 (BP Location: Right arm, Patient Position: Sitting, Cuff Size: Adult Large)  Pulse 75  Temp 99.9  F (37.7  C) (Oral)  Ht 5' 6\" (1.676 m)  Wt 192 lb 1.6 oz (87.1 kg)  SpO2 98%  BMI 31.01 kg/m2  Body mass index is 31.01 kg/(m^2).  GENERAL: healthy, alert and no distress  RESP: lungs clear to auscultation - no rales, rhonchi or wheezes  CV: regular rate and rhythm, normal S1 S2, no S3 or S4, no murmur, click or rub, no peripheral edema and peripheral pulses strong  ABDOMEN: soft, nontender, no hepatosplenomegaly, no masses and bowel sounds normal  MS: no gross musculoskeletal defects noted, no edema  Walking slowly with a stiff gait  SKIN: no suspicious lesions or rashes  NEURO: Normal strength and tone, mentation intact and speech normal  PSYCH: mentation appears normal, affect normal/bright    Diagnostic Test Results:  none     ASSESSMENT/PLAN:     Hypertension; controlled   Associated with the following " "complications:    None   Plan:  No changes in the patient's current treatment plan    Migraine: Needing dose adjustment   Plan:  As mentioned below      BMI:   Estimated body mass index is 31.01 kg/(m^2) as calculated from the following:    Height as of this encounter: 5' 6\" (1.676 m).    Weight as of this encounter: 192 lb 1.6 oz (87.1 kg).   Weight management plan: Discussed healthy diet and exercise guidelines and patient will follow up in 6 months in clinic to re-evaluate.      1. Hypertension goal BP (blood pressure) < 140/90  BP Readings from Last 6 Encounters:   05/16/18 108/70   10/18/17 112/76   08/04/17 108/70   01/30/17 130/90   11/17/16 122/87   04/25/16 116/80     Blood pressure is at goal, continue with Lotrel at current dose, low-salt diet, weight loss, regular exercises.  Recheck in 6 months at the time of physical along with fasting labs or sooner if needed.  - amLODIPine-benazepril (LOTREL) 5-10 MG per capsule; Take 1 capsule by mouth daily  Dispense: 90 capsule; Refill: 3    2. Intractable migraine with aura without status migrainosus  Needing dose adjustment on medication.    Will increase her dose of Imitrex to 50 mg 100 mg at the onset of migraines as needed, continue to avoid potential triggers for migraine, recheck in 6 months or sooner if needed.  - SUMAtriptan (IMITREX) 100 MG tablet; Take 1 tablet (100 mg) by mouth at onset of headache for migraine May repeat in 2 hours. Max 2 tablets/24 hours.  Dispense: 9 tablet; Refill: 2    3. Status post lumbar spinal fusion  Recommended patient to start taking Caltrate D twice daily  Continue with current medications, spine surgeon follow-up as scheduled    4. Obesity (BMI 30-39.9)  Wt Readings from Last 5 Encounters:   05/16/18 192 lb 1.6 oz (87.1 kg)   10/18/17 196 lb (88.9 kg)   08/04/17 197 lb 14.4 oz (89.8 kg)   01/30/17 206 lb (93.4 kg)   11/17/16 200 lb (90.7 kg)     Appreciated patient's efforts on weight loss, continue with portion control, " healthy eating, regular exercises, recheck in 6 months.      Chart documentation done in part with Dragon Voice recognition Software. Although reviewed after completion, some word and grammatical error may remain.    See Patient Instructions    Jassi Washington MD  [facility]

## 2018-05-24 ENCOUNTER — RADIANT APPOINTMENT (OUTPATIENT)
Dept: GENERAL RADIOLOGY | Facility: CLINIC | Age: 48
End: 2018-05-24
Attending: NURSE PRACTITIONER
Payer: COMMERCIAL

## 2018-05-24 ENCOUNTER — OFFICE VISIT (OUTPATIENT)
Dept: URGENT CARE | Facility: URGENT CARE | Age: 48
End: 2018-05-24
Payer: COMMERCIAL

## 2018-05-24 VITALS
TEMPERATURE: 99.8 F | WEIGHT: 187.4 LBS | BODY MASS INDEX: 30.25 KG/M2 | SYSTOLIC BLOOD PRESSURE: 116 MMHG | OXYGEN SATURATION: 97 % | DIASTOLIC BLOOD PRESSURE: 84 MMHG | RESPIRATION RATE: 20 BRPM | HEART RATE: 98 BPM

## 2018-05-24 DIAGNOSIS — R07.0 THROAT PAIN: Primary | ICD-10-CM

## 2018-05-24 DIAGNOSIS — R05.3 COUGH, PERSISTENT: ICD-10-CM

## 2018-05-24 PROCEDURE — 99213 OFFICE O/P EST LOW 20 MIN: CPT | Performed by: NURSE PRACTITIONER

## 2018-05-24 PROCEDURE — 71046 X-RAY EXAM CHEST 2 VIEWS: CPT | Mod: FY

## 2018-05-24 RX ORDER — AZITHROMYCIN 250 MG/1
TABLET, FILM COATED ORAL
Qty: 6 TABLET | Refills: 0 | Status: SHIPPED | OUTPATIENT
Start: 2018-05-24 | End: 2018-10-28

## 2018-05-24 RX ORDER — CODEINE PHOSPHATE AND GUAIFENESIN 10; 100 MG/5ML; MG/5ML
1 SOLUTION ORAL EVERY 4 HOURS PRN
Qty: 200 ML | Refills: 0 | Status: SHIPPED | OUTPATIENT
Start: 2018-05-24 | End: 2018-05-29

## 2018-05-24 ASSESSMENT — ENCOUNTER SYMPTOMS
CHILLS: 0
COUGH: 1
VOMITING: 0
SORE THROAT: 0
RHINORRHEA: 0
FEVER: 1
HEADACHES: 0
DIARRHEA: 0
SHORTNESS OF BREATH: 0
NAUSEA: 0
FATIGUE: 1
WHEEZING: 1

## 2018-05-24 NOTE — PATIENT INSTRUCTIONS
Chronic Cough with Uncertain Cause (Adult)    Everyone has had a cough as part of the common cold, flu, or bronchitis. This kind of cough occurs along with an achy feeling, low-grade fever, nasal and sinus congestion, and a scratchy or sore throat. This usually gets better in 2 to 3 weeks. A cough that lasts longer than 3 weeks may be due to other causes.  If your cough does not improve over the next 2 weeks, further testing may be needed. Follow up with your healthcare provider as advised. Cough suppressants may be recommended. Based on your exam today, the exact cause of your cough is not certain. Below are some common causes for persistent cough.  Smoker's cough  Smoker s cough doesn t go away. If you continue to smoke, it only gets worse. The cough is from irritation in the air passages. Talk to your healthcare provider about quitting. Medicines or nicotine-replacement products, like gum or the patch, may make quitting easier.  Postnasal drip  A cough that is worse at night may be due to postnasal drip. Excess mucus in the nose drains from the back of your nose to your throat. This triggers the cough reflex. Postnasal drip may be due to a sinus infection or allergy. Common allergens include dust, tobacco smoke (both inhaled and secondhand smoke), environmental pollutants, pollen, mold, pets, cleaning agents, room deodorizers, and chemical fumes. Over-the-counter antihistamines or decongestants may be helpful for allergies. A sinus infection may requires antibiotic treatment. See your healthcare provider if symptoms continue.  Medicines  Certain prescribed medicines can cause a chronic cough in some people:    ACE inhibitors for high blood pressure. These include benazepril, captopril, enalapril, fosinopril, lisinopril, quinapril, ramipril, and others.    Beta-blockers for high blood pressure and other conditions. These include propranolol, atenolol, metoprolol, nadolol, and others.  Let your healthcare  provider know if you are taking any of these.  Asthma  Cough may be the only sign of mild asthma. You may have tests to find out if asthma is causing your cough. You may also take asthma medicine on a trial basis.  Acid reflux (heartburn, GERD)  The esophagus is the tube that carries food from the mouth to the stomach. A valve at its lower end prevents stomach acids from flowing upward. If this valve does not work properly, acid from the stomach enters the esophagus. This may cause a burning pain in the upper abdomen or lower chest, belching, or cough. Symptoms are often worse when lying flat. Avoid eating or drinking before bedtime. Try using extra pillows to raise your upper body, or place 4-inch blocks under the head of your bed. You may try an over-the-counter antacid or an acid-blocking medicine such as famotidine, cimetidine, ranitidine, esomeprazole, lansoprazole, or omeprazole. Stronger medicines for this condition can be prescribed by your healthcare provider.  Follow-up care  Follow up with your healthcare provider, or as advised, if your cough does not improve. Further testing may be needed.  Note: If an X-ray was taken, a specialist will review it. You will be notified of any new findings that may affect your care.  When to seek medical advice  Call your healthcare provider right away if any of these occur:    Mild wheezing or difficulty breathing    Fever of 100.4 F (38 C) or higher, or as directed by your healthcare provider    Unexpected weight loss    Coughing up large amounts of colored sputum    Night sweats (sheets and pajamas get soaking wet)  Call 911  Call 911 if any of these occur:    Coughing up blood    Moderate to severe trouble breathing or wheezing  Date Last Reviewed: 9/13/2015 2000-2017 [employer]. [address] [place] [address], [place] [address]. All rights reserved. This information is not intended as a substitute for professional medical care. Always follow your healthcare  professional's instructions.

## 2018-05-24 NOTE — PROGRESS NOTES
SUBJECTIVE:   Kayla Fuentes is a 47 year old female presenting with a chief complaint of   Chief Complaint   Patient presents with     Fever     Patient complains of fever for more than a week, sore throat, productive cough, some chest tightness.       She is an established patient of [facility].    Kayla is a 47-year-old female who is presenting here today with cough, fever, sore throat and fatigue that started 10 days ago.  Accompanying the cough is tenderness in the chest,wheezing and  dark yellow sputum.  [facility] and referred to urgent care, she was given benzonatate for cough.  She admits to seasonal allergies but this time the cough is different.      Review of Systems   Constitutional: Positive for fatigue and fever. Negative for chills.   HENT: Negative for congestion, ear pain, rhinorrhea and sore throat.    Respiratory: Positive for cough (with chest tenderness) and wheezing. Negative for shortness of breath.    Gastrointestinal: Negative for diarrhea, nausea and vomiting.   Neurological: Negative for headaches.       History reviewed. No pertinent past medical history.  History reviewed. No pertinent family history.  Current Outpatient Prescriptions   Medication Sig Dispense Refill     Acetaminophen (TYLENOL ARTHRITIS PAIN PO) Take 1,300 mg by mouth 2 times daily       amLODIPine-benazepril (LOTREL) 5-10 MG per capsule Take 1 capsule by mouth daily 90 capsule 3     Cyclobenzaprine HCl (FLEXERIL PO) Take 5 mg by mouth At Bedtime       GABAPENTIN PO Take 100 mg by mouth 3 times daily       SUMAtriptan (IMITREX) 100 MG tablet Take 1 tablet (100 mg) by mouth at onset of headache for migraine May repeat in 2 hours. Max 2 tablets/24 hours. 9 tablet 2     SUMAtriptan (IMITREX) 50 MG tablet TAKE 1 TABLET BY MOUTH AT ONSET OF MIGRAINE. MAY REPEAT DOSE IN 2 HRS. *MAX 200MG/24 HRS* 9 tablet 3     VIENVA 0.1-20 MG-MCG per tablet Take 1 tablet by mouth daily  3     DiphenhydrAMINE HCl  (BENADRYL PO) Take 25 mg by mouth daily as needed for allergies       OXYCODONE HCL PO Take 5 mg by mouth every 6 hours as needed       Social History   Substance Use Topics     Smoking status: Former Smoker     Quit date: 3/15/2015     Smokeless tobacco: Never Used     Alcohol use Yes       OBJECTIVE  /84  Pulse 98  Temp 99.8  F (37.7  C)  Resp 20  Wt 187 lb 6.4 oz (85 kg)  SpO2 97%  BMI 30.25 kg/m2    Physical Exam   Constitutional: She appears well-developed and well-nourished. No distress.   HENT:   Head: Normocephalic and atraumatic.   Right Ear: Tympanic membrane and external ear normal.   Left Ear: Tympanic membrane and external ear normal.   Nose: Rhinorrhea present.   Mouth/Throat: Oropharynx is clear and moist.   Eyes: EOM are normal. Pupils are equal, round, and reactive to light.   Neck: Normal range of motion. Neck supple.   Pulmonary/Chest: Effort normal and breath sounds normal. No respiratory distress. She exhibits tenderness (with cough).   Lymphadenopathy:     She has no cervical adenopathy.   Neurological: She is alert. No cranial nerve deficit.   Skin: Skin is warm and dry. She is not diaphoretic.   Psychiatric: She has a normal mood and affect.   Nursing note and vitals reviewed.        ASSESSMENT:      ICD-10-CM    1. Throat pain R07.0 CANCELED: Strep, Rapid Screen   2. Cough, persistent R05 XR Chest 2 Views            Differential Diagnosis:  URI Adult/Peds:  Bronchitis-bacteriall, Laryngitis, Sinusitis and Viral upper respiratory illness    Serious Comorbid Conditions:  Adult:  None    PLAN:  I discussed lab results with the patient.  I recommend follow up with PCP in 3 days or sooner if symptoms are getting worse  Side effects of medications discussed  Over the counter medications discussed  All questions are answered and patient is in agreement with treatment plan  Monica Sheehan  [facility]  Family Nurse Practitoner        There are no Patient Instructions on file for this visit.

## 2018-05-24 NOTE — MR AVS SNAPSHOT
After Visit Summary   5/24/2018    Kayla Fuentes    MRN: [id number]           Patient Information     Date Of Birth          1970        Visit Information        Provider Department      5/24/2018 12:05 PM Monica Sheehan NP [facility]        Today's Diagnoses     Throat pain    -  1    Cough, persistent          Care Instructions      Chronic Cough with Uncertain Cause (Adult)    Everyone has had a cough as part of the common cold, flu, or bronchitis. This kind of cough occurs along with an achy feeling, low-grade fever, nasal and sinus congestion, and a scratchy or sore throat. This usually gets better in 2 to 3 weeks. A cough that lasts longer than 3 weeks may be due to other causes.  If your cough does not improve over the next 2 weeks, further testing may be needed. Follow up with your healthcare provider as advised. Cough suppressants may be recommended. Based on your exam today, the exact cause of your cough is not certain. Below are some common causes for persistent cough.  Smoker's cough  Smoker s cough doesn t go away. If you continue to smoke, it only gets worse. The cough is from irritation in the air passages. Talk to your healthcare provider about quitting. Medicines or nicotine-replacement products, like gum or the patch, may make quitting easier.  Postnasal drip  A cough that is worse at night may be due to postnasal drip. Excess mucus in the nose drains from the back of your nose to your throat. This triggers the cough reflex. Postnasal drip may be due to a sinus infection or allergy. Common allergens include dust, tobacco smoke (both inhaled and secondhand smoke), environmental pollutants, pollen, mold, pets, cleaning agents, room deodorizers, and chemical fumes. Over-the-counter antihistamines or decongestants may be helpful for allergies. A sinus infection may requires antibiotic treatment. See your healthcare provider if symptoms  continue.  Medicines  Certain prescribed medicines can cause a chronic cough in some people:    ACE inhibitors for high blood pressure. These include benazepril, captopril, enalapril, fosinopril, lisinopril, quinapril, ramipril, and others.    Beta-blockers for high blood pressure and other conditions. These include propranolol, atenolol, metoprolol, nadolol, and others.  Let your healthcare provider know if you are taking any of these.  Asthma  Cough may be the only sign of mild asthma. You may have tests to find out if asthma is causing your cough. You may also take asthma medicine on a trial basis.  Acid reflux (heartburn, GERD)  The esophagus is the tube that carries food from the mouth to the stomach. A valve at its lower end prevents stomach acids from flowing upward. If this valve does not work properly, acid from the stomach enters the esophagus. This may cause a burning pain in the upper abdomen or lower chest, belching, or cough. Symptoms are often worse when lying flat. Avoid eating or drinking before bedtime. Try using extra pillows to raise your upper body, or place 4-inch blocks under the head of your bed. You may try an over-the-counter antacid or an acid-blocking medicine such as famotidine, cimetidine, ranitidine, esomeprazole, lansoprazole, or omeprazole. Stronger medicines for this condition can be prescribed by your healthcare provider.  Follow-up care  Follow up with your healthcare provider, or as advised, if your cough does not improve. Further testing may be needed.  Note: If an X-ray was taken, a specialist will review it. You will be notified of any new findings that may affect your care.  When to seek medical advice  Call your healthcare provider right away if any of these occur:    Mild wheezing or difficulty breathing    Fever of 100.4 F (38 C) or higher, or as directed by your healthcare provider    Unexpected weight loss    Coughing up large amounts of colored sputum    Night sweats  "(sheets and pajamas get soaking wet)  Call 911  Call 911 if any of these occur:    Coughing up blood    Moderate to severe trouble breathing or wheezing  Date Last Reviewed: 2015-2017 [employer]. [address] [place] [address], [place] [address]. All rights reserved. This information is not intended as a substitute for professional medical care. Always follow your healthcare professional's instructions.                Follow-ups after your visit        Who to contact     If you have questions or need follow up information about today's clinic visit or your schedule please contact [facility] directly at [phone].  Normal or non-critical lab and imaging results will be communicated to you by MyChart, letter or phone within 4 business days after the clinic has received the results. If you do not hear from us within 7 days, please contact the clinic through MyChart or phone. If you have a critical or abnormal lab result, we will notify you by phone as soon as possible.  Submit refill requests through [employer] or call your pharmacy and they will forward the refill request to us. Please allow 3 business days for your refill to be completed.          Additional Information About Your Visit        MyChart Information     [employer] lets you send messages to your doctor, view your test results, renew your prescriptions, schedule appointments and more. To sign up, go to [email or web address] . Click on \"Log in\" on the left side of the screen, which will take you to the Welcome page. Then click on \"Sign up Now\" on the right side of the page.     You will be asked to enter the access code listed below, as well as some personal information. Please follow the directions to create your username and password.     Your access code is: [email or web address]  Expires: 2018  9:58 AM     Your access code will  in 90 days. If you need help or a new code, please call your [place] clinic or " [phone].        Care EveryWhere ID     This is your Care EveryWhere ID. This could be used by other organizations to access your [place] medical records  [id number]        Your Vitals Were     Pulse Temperature Respirations Pulse Oximetry BMI (Body Mass Index)       98 99.8  F (37.7  C) 20 97% 30.25 kg/m2        Blood Pressure from Last 3 Encounters:   05/24/18 116/84   05/16/18 108/70   10/18/17 112/76    Weight from Last 3 Encounters:   05/24/18 187 lb 6.4 oz (85 kg)   05/16/18 192 lb 1.6 oz (87.1 kg)   10/18/17 196 lb (88.9 kg)              We Performed the Following     XR Chest 2 Views          Today's Medication Changes          These changes are accurate as of 5/24/18  1:24 PM.  If you have any questions, ask your nurse or doctor.               Start taking these medicines.        Dose/Directions    azithromycin 250 MG tablet   Commonly known as:  ZITHROMAX   Used for:  Cough, persistent   Started by:  Monica Sheehan NP        Two tablets first day, then one tablet daily for four days.   Quantity:  6 tablet   Refills:  0       guaiFENesin-codeine 100-10 MG/5ML Soln solution   Commonly known as:  ROBITUSSIN AC   Used for:  Cough, persistent   Started by:  Monica Sheehan NP        Dose:  1 tsp.   Take 5 mLs by mouth every 4 hours as needed   Quantity:  200 mL   Refills:  0            Where to get your medicines      These medications were sent to [facility]/pharmacy #[id number] - [place], [place] - [address] [facility], [address], [facility] [address]     Phone:  [phone]     azithromycin 250 MG tablet         Some of these will need a paper prescription and others can be bought over the counter.  Ask your nurse if you have questions.     Bring a paper prescription for each of these medications     guaiFENesin-codeine 100-10 MG/5ML Soln solution                Primary Care Provider Office Phone # Fax #    Jassi Washington [phone] [address] [facility]  [place]  [facility] [address]        Equal Access to Services     CARLOS KAY : Hadii alex ku hadzenaida Soparthaali, waaxda luqadaha, qaybta kaosvaldoda chitrakseniavinnie, toño hightower taylerneha sheetshasmukhgraham heaton . So [facility] [phone].    ATENCIÓN: Si habla español, tiene a melo disposición servicios gratuitos de asistencia lingüística. Llame al [phone].    We comply with applicable federal civil rights laws and [place] laws. We do not discriminate on the basis of race, color, national origin, age, disability, sex, sexual orientation, or gender identity.            Thank you!     Thank you for choosing [facility]  for your care. Our goal is always to provide you with excellent care. Hearing back from our patients is one way we can continue to improve our services. Please take a few minutes to complete the written survey that you may receive in the mail after your visit with us. Thank you!             Your Updated Medication List - Protect others around you: Learn how to safely use, store and throw away your medicines at www.disposemymeds.org.          This list is accurate as of 5/24/18  1:24 PM.  Always use your most recent med list.                   Brand Name Dispense Instructions for use Diagnosis    amLODIPine-benazepril 5-10 MG per capsule    LOTREL    90 capsule    Take 1 capsule by mouth daily    Hypertension goal BP (blood pressure) < 140/90       azithromycin 250 MG tablet    ZITHROMAX    6 tablet    Two tablets first day, then one tablet daily for four days.    Cough, persistent       BENADRYL PO      Take 25 mg by mouth daily as needed for allergies        FLEXERIL PO      Take 5 mg by mouth At Bedtime        GABAPENTIN PO      Take 100 mg by mouth 3 times daily        guaiFENesin-codeine 100-10 MG/5ML Soln solution    ROBITUSSIN AC    200 mL    Take 5 mLs by mouth every 4 hours as needed    Cough, persistent       OXYCODONE HCL PO      Take 5 mg by mouth every 6 hours as needed        *  SUMAtriptan 50 MG tablet    IMITREX    9 tablet    TAKE 1 TABLET BY MOUTH AT ONSET OF MIGRAINE. MAY REPEAT DOSE IN 2 HRS. *MAX 200MG/24 HRS*    Intractable migraine with aura without status migrainosus       * SUMAtriptan 100 MG tablet    IMITREX    9 tablet    Take 1 tablet (100 mg) by mouth at onset of headache for migraine May repeat in 2 hours. Max 2 tablets/24 hours.    Intractable migraine with aura without status migrainosus       TYLENOL ARTHRITIS PAIN PO      Take 1,300 mg by mouth 2 times daily        VIENVA 0.1-20 MG-MCG per tablet   Generic drug:  levonorgestrel-ethinyl estradiol      Take 1 tablet by mouth daily        * Notice:  This list has 2 medication(s) that are the same as other medications prescribed for you. Read the directions carefully, and ask your doctor or other care provider to review them with you.

## 2018-09-06 ENCOUNTER — TRANSFERRED RECORDS (OUTPATIENT)
Dept: HEALTH INFORMATION MANAGEMENT | Facility: CLINIC | Age: 48
End: 2018-09-06

## 2018-10-16 ENCOUNTER — ALLIED HEALTH/NURSE VISIT (OUTPATIENT)
Dept: PEDIATRICS | Facility: CLINIC | Age: 48
End: 2018-10-16
Payer: COMMERCIAL

## 2018-10-16 DIAGNOSIS — Z23 NEED FOR PROPHYLACTIC VACCINATION AND INOCULATION AGAINST INFLUENZA: Primary | ICD-10-CM

## 2018-10-16 PROCEDURE — 99207 ZZC NO CHARGE NURSE ONLY: CPT

## 2018-10-16 PROCEDURE — 90686 IIV4 VACC NO PRSV 0.5 ML IM: CPT

## 2018-10-16 PROCEDURE — 90471 IMMUNIZATION ADMIN: CPT

## 2018-10-16 NOTE — MR AVS SNAPSHOT
After Visit Summary   10/16/2018    Kayla Fuentes    MRN: [id number]           Patient Information     Date Of Birth          1970        Visit Information        Provider Department      10/16/2018 1:20 PM MG RN VISITS [facility] [place]        Today's Diagnoses     Need for prophylactic vaccination and inoculation against influenza    -  1       Follow-ups after your visit        Your next 10 appointments already scheduled     Nov 26, 2018  8:00 AM CST   LAB with LAB FIRST FLOOR [facility] ([facility] [place])    [address] [place] [id number] [phone]           Please do not eat 10-12 hours before your appointment if you are coming in fasting for labs on lipids, cholesterol, or glucose (sugar). This does not apply to pregnant women. Water, hot tea and black coffee (with nothing added) are okay. Do not drink other fluids, diet soda or chew gum.            Nov 26, 2018  8:30 AM CST   PHYSICAL with Jassi Washington MD   [facility] ([facility]    [address] [place] [id number] [phone]              Who to contact     If you have questions or need follow up information about today's clinic visit or your schedule please contact [facility] directly at [phone].  Normal or non-critical lab and imaging results will be communicated to you by MyChart, letter or phone within 4 business days after the clinic has received the results. If you do not hear from us within 7 days, please contact the clinic through MyChart or phone. If you have a critical or abnormal lab result, we will notify you by phone as soon as possible.  Submit refill requests through [employer] or call your pharmacy and they will forward the refill request to us. Please allow 3 business days for your refill to be completed.          Additional Information About Your Visit        Care  EveryWhere ID     This is your Care EveryWhere ID. This could be used by other organizations to access your [place] medical records  [id number]         Blood Pressure from Last 3 Encounters:   05/24/18 116/84   05/16/18 108/70   10/18/17 112/76    Weight from Last 3 Encounters:   05/24/18 187 lb 6.4 oz (85 kg)   05/16/18 192 lb 1.6 oz (87.1 kg)   10/18/17 196 lb (88.9 kg)              We Performed the Following     FLU VACCINE, SPLIT VIRUS, IM (QUADRIVALENT) [86959]- >3 YRS     Vaccine Administration, Initial [[id number]]        Primary Care Provider Office Phone # Fax #    Jassi Washington [phone] [address]  [facility] [address]        Equal Access to Services     [facility] HospitalKA[facility]K : Hadii aad ku hadasho Soomaali, waaxda luqadaha, qaybta kaalmada adeegyada, toño hightower hayronan chitra heaton . So [facility] [phone].    ATENCIÓN: Si habla español, tiene a melo disposición servicios gratuitos de asistencia lingüística. Llame al [phone].    We comply with applicable federal civil rights laws and [place] laws. We do not discriminate on the basis of race, color, national origin, age, disability, sex, sexual orientation, or gender identity.            Thank you!     Thank you for choosing [facility]  for your care. Our goal is always to provide you with excellent care. Hearing back from our patients is one way we can continue to improve our services. Please take a few minutes to complete the written survey that you may receive in the mail after your visit with us. Thank you!             Your Updated Medication List - Protect others around you: Learn how to safely use, store and throw away your medicines at www.disposemymeds.org.          This list is accurate as of 10/16/18  1:35 PM.  Always use your most recent med list.                   Brand Name Dispense Instructions for use Diagnosis    amLODIPine-benazepril 5-10 MG per capsule    LOTREL    90 capsule    Take  1 capsule by mouth daily    Hypertension goal BP (blood pressure) < 140/90       azithromycin 250 MG tablet    ZITHROMAX    6 tablet    Two tablets first day, then one tablet daily for four days.    Cough, persistent       BENADRYL PO      Take 25 mg by mouth daily as needed for allergies        FLEXERIL PO      Take 5 mg by mouth At Bedtime        GABAPENTIN PO      Take 100 mg by mouth 3 times daily        OXYCODONE HCL PO      Take 5 mg by mouth every 6 hours as needed        * SUMAtriptan 50 MG tablet    IMITREX    9 tablet    TAKE 1 TABLET BY MOUTH AT ONSET OF MIGRAINE. MAY REPEAT DOSE IN 2 HRS. *MAX 200MG/24 HRS*    Intractable migraine with aura without status migrainosus       * SUMAtriptan 100 MG tablet    IMITREX    9 tablet    Take 1 tablet (100 mg) by mouth at onset of headache for migraine May repeat in 2 hours. Max 2 tablets/24 hours.    Intractable migraine with aura without status migrainosus       TYLENOL ARTHRITIS PAIN PO      Take 1,300 mg by mouth 2 times daily        VIENVA 0.1-20 MG-MCG per tablet   Generic drug:  levonorgestrel-ethinyl estradiol      Take 1 tablet by mouth daily        * Notice:  This list has 2 medication(s) that are the same as other medications prescribed for you. Read the directions carefully, and ask your doctor or other care provider to review them with you.

## 2018-10-16 NOTE — PROGRESS NOTES

## 2018-10-28 ENCOUNTER — OFFICE VISIT (OUTPATIENT)
Dept: URGENT CARE | Facility: URGENT CARE | Age: 48
End: 2018-10-28
Payer: COMMERCIAL

## 2018-10-28 VITALS
OXYGEN SATURATION: 95 % | WEIGHT: 193 LBS | RESPIRATION RATE: 16 BRPM | TEMPERATURE: 98.4 F | DIASTOLIC BLOOD PRESSURE: 75 MMHG | SYSTOLIC BLOOD PRESSURE: 117 MMHG | BODY MASS INDEX: 31.15 KG/M2 | HEART RATE: 116 BPM

## 2018-10-28 DIAGNOSIS — J06.9 VIRAL URI WITH COUGH: Primary | ICD-10-CM

## 2018-10-28 LAB
FLUAV+FLUBV AG SPEC QL: NEGATIVE
FLUAV+FLUBV AG SPEC QL: NEGATIVE
SPECIMEN SOURCE: NORMAL

## 2018-10-28 PROCEDURE — 87804 INFLUENZA ASSAY W/OPTIC: CPT | Performed by: FAMILY MEDICINE

## 2018-10-28 PROCEDURE — 99213 OFFICE O/P EST LOW 20 MIN: CPT | Performed by: FAMILY MEDICINE

## 2018-10-28 ASSESSMENT — PAIN SCALES - GENERAL: PAINLEVEL: SEVERE PAIN (7)

## 2018-10-28 NOTE — MR AVS SNAPSHOT
After Visit Summary   10/28/2018    Kayla Fuentes    MRN: [id number]           Patient Information     Date Of Birth          1970        Visit Information        Provider Department      10/28/2018 2:00 PM Jay Pierce MD [facility]        Today's Diagnoses     Viral URI with cough    -  1      Care Instructions    Take over the counter cold remedies as you have been using for your cold              Follow-ups after your visit        Your next 10 appointments already scheduled     Nov 26, 2018  8:00 AM CST   LAB with LAB FIRST FLOOR [facility] ([facility])    [address] [place] [address] [id number] [phone]           Please do not eat 10-12 hours before your appointment if you are coming in fasting for labs on lipids, cholesterol, or glucose (sugar). This does not apply to pregnant women. Water, hot tea and black coffee (with nothing added) are okay. Do not drink other fluids, diet soda or chew gum.            Nov 26, 2018  8:30 AM CST   PHYSICAL with Jassi Washington MD   [facility] ([facility]    [address] [place] [address] [phone]              Who to contact     If you have questions or need follow up information about today's clinic visit or your schedule please contact [facility] directly at [phone].  Normal or non-critical lab and imaging results will be communicated to you by MyChart, letter or phone within 4 business days after the clinic has received the results. If you do not hear from us within 7 days, please contact the clinic through MyChart or phone. If you have a critical or abnormal lab result, we will notify you by phone as soon as possible.  Submit refill requests through [employer] or call your pharmacy and they will forward the refill request to us. Please allow 3 business days for your refill to be  completed.          Additional Information About Your Visit        Care EveryWhere ID     This is your Care EveryWhere ID. This could be used by other organizations to access your [place] medical records  [id number]        Your Vitals Were     Pulse Temperature Respirations Pulse Oximetry BMI (Body Mass Index)       116 98.4  F (36.9  C) (Oral) 16 95% 31.15 kg/m2        Blood Pressure from Last 3 Encounters:   10/28/18 117/75   05/24/18 116/84   05/16/18 108/70    Weight from Last 3 Encounters:   10/28/18 193 lb (87.5 kg)   05/24/18 187 lb 6.4 oz (85 kg)   05/16/18 192 lb 1.6 oz (87.1 kg)              We Performed the Following     Influenza A/B antigen        Primary Care Provider Office Phone # Fax #    Jsasi Washington [phone]       [address]  [facility] [address]        Equal Access to Services     [facility] : Hadii aad ku hadasho Soomaali, waaxda luqadaha, qaybta kaalmada adeegyada, waxay campbell hayhunter heaton . So [facility] [phone].    ATENCIÓN: Si habla español, tiene a melo disposición servicios gratuitos de asistencia lingüística. Llame al [phone].    We comply with applicable federal civil rights laws and [place] laws. We do not discriminate on the basis of race, color, national origin, age, disability, sex, sexual orientation, or gender identity.            Thank you!     Thank you for choosing [facility]  for your care. Our goal is always to provide you with excellent care. Hearing back from our patients is one way we can continue to improve our services. Please take a few minutes to complete the written survey that you may receive in the mail after your visit with us. Thank you!             Your Updated Medication List - Protect others around you: Learn how to safely use, store and throw away your medicines at www.disposemymeds.org.          This list is accurate as of 10/28/18  2:45 PM.  Always use your most recent med list.                    Brand Name Dispense Instructions for use Diagnosis    amLODIPine-benazepril 5-10 MG per capsule    LOTREL    90 capsule    Take 1 capsule by mouth daily    Hypertension goal BP (blood pressure) < 140/90       BENADRYL PO      Take 25 mg by mouth daily as needed for allergies        FLEXERIL PO      Take 5 mg by mouth At Bedtime        GABAPENTIN PO      Take 100 mg by mouth 3 times daily        OXYCODONE HCL PO      Take 5 mg by mouth every 6 hours as needed        * SUMAtriptan 50 MG tablet    IMITREX    9 tablet    TAKE 1 TABLET BY MOUTH AT ONSET OF MIGRAINE. MAY REPEAT DOSE IN 2 HRS. *MAX 200MG/24 HRS*    Intractable migraine with aura without status migrainosus       * SUMAtriptan 100 MG tablet    IMITREX    9 tablet    Take 1 tablet (100 mg) by mouth at onset of headache for migraine May repeat in 2 hours. Max 2 tablets/24 hours.    Intractable migraine with aura without status migrainosus       TYLENOL ARTHRITIS PAIN PO      Take 1,300 mg by mouth 2 times daily        VIENVA 0.1-20 MG-MCG per tablet   Generic drug:  levonorgestrel-ethinyl estradiol      Take 1 tablet by mouth daily        * Notice:  This list has 2 medication(s) that are the same as other medications prescribed for you. Read the directions carefully, and ask your doctor or other care provider to review them with you.

## 2018-10-28 NOTE — PROGRESS NOTES
Subjective:   Kayla Fuentes is a 47 year old female who presents for   Chief Complaint   Patient presents with     Flu     Has had cough/congestion symptoms since Friday. Having chills/sweats.   Niece did have strep but has no throat discomfort at this time.   Temps over 99.5 since Friday night. Has taken mucinex DM - which has helped..wears off after a few hours. Taking tylenol for fevers.     Patient Active Problem List    Diagnosis Date Noted     Status post lumbar spinal fusion 05/16/2018     Priority: Medium     Hyperlipidemia LDL goal <160 08/08/2017     Priority: Medium     Elevated fasting blood sugar 08/08/2017     Priority: Medium     Elevated liver enzymes 08/04/2017     Priority: Medium     Chronic bilateral low back pain without sciatica 08/04/2017     Priority: Medium     Acute right flank pain 11/17/2016     Priority: Medium     History of renal calculi 11/17/2016     Priority: Medium     Obesity (BMI 30-39.9) 04/25/2016     Priority: Medium     Microalbuminuria 04/25/2016     Priority: Medium     TMJ (temporomandibular joint syndrome) 10/13/2015     Priority: Medium     Hypertension goal BP (blood pressure) < 140/90 05/26/2015     Priority: Medium     Intractable migraine with aura without status migrainosus 05/26/2015     Priority: Medium     Chronic low back pain 05/26/2015     Priority: Medium       Current Outpatient Prescriptions   Medication     Acetaminophen (TYLENOL ARTHRITIS PAIN PO)     amLODIPine-benazepril (LOTREL) 5-10 MG per capsule     Cyclobenzaprine HCl (FLEXERIL PO)     DiphenhydrAMINE HCl (BENADRYL PO)     GABAPENTIN PO     OXYCODONE HCL PO     SUMAtriptan (IMITREX) 100 MG tablet     SUMAtriptan (IMITREX) 50 MG tablet     VIENVA 0.1-20 MG-MCG per tablet     No current facility-administered medications for this visit.      ROS:  As above per HPI    Objective:   /75 (BP Location: Left arm, Patient Position: Sitting, Cuff Size: Adult Regular)  Pulse 116  Temp 98.4  F (36.9   C) (Oral)  Resp 16  Wt 193 lb (87.5 kg)  SpO2 95%  BMI 31.15 kg/m2, Body mass index is 31.15 kg/(m^2).  Gen:  NAD, well-nourished, sitting in chair comfortably  HEENT: EOMI, sclera anicteric, Head normocephalic, ; nares patent; moist mucous membranes, tongue scalloping, no oral lesions, mallampati III/IV  Neck: trachea midline, no thyromegaly  CV:  Hemodynamically stable, RRR  Pulm:  no increased work of breathing , CTAB, no wheezes/rales/rhonchi   Neuro: CN II-XII intact, no facial droop, good mentation  Extrem: no cyanosis, edema or clubbing  Skin: no obvious rashes or abnormalities  Psych: Euthymic, linear thoughts, normal rate of speech    Results for orders placed or performed in visit on 10/28/18   Influenza A/B antigen   Result Value Ref Range    Influenza A/B Agn Specimen Nasal     Influenza A Negative NEG^Negative    Influenza B Negative NEG^Negative       Assessment & Plan:   Kayla Fuentes, 47 year old female who presents with:  Viral URI with cough  Afebrile here. NO evidence of pneumonia on history and exam. Supportive therapy recommended - OTC cold remedies. Follow-up in 1 week if no improvement. Negative flu.     Jay Pierce MD   [facility]     Options for treatment and/or follow-up care were reviewed with the patient. Kayla Fuentes and/or legal guardian was engaged and actively involved in the decision making process. Patient/guardian verbalized understanding of the options discussed and was satisfied with the final plan.

## 2018-11-05 ENCOUNTER — OFFICE VISIT (OUTPATIENT)
Dept: PEDIATRICS | Facility: CLINIC | Age: 48
End: 2018-11-05
Payer: COMMERCIAL

## 2018-11-05 VITALS
TEMPERATURE: 98.8 F | HEIGHT: 66 IN | SYSTOLIC BLOOD PRESSURE: 118 MMHG | OXYGEN SATURATION: 98 % | DIASTOLIC BLOOD PRESSURE: 81 MMHG | WEIGHT: 193.1 LBS | BODY MASS INDEX: 31.03 KG/M2 | HEART RATE: 66 BPM

## 2018-11-05 DIAGNOSIS — J01.01 ACUTE RECURRENT MAXILLARY SINUSITIS: Primary | ICD-10-CM

## 2018-11-05 PROCEDURE — 99213 OFFICE O/P EST LOW 20 MIN: CPT | Performed by: INTERNAL MEDICINE

## 2018-11-05 RX ORDER — AMOXICILLIN 500 MG/1
500 CAPSULE ORAL 3 TIMES DAILY
Qty: 15 CAPSULE | Refills: 0 | Status: SHIPPED | OUTPATIENT
Start: 2018-11-05 | End: 2018-11-10

## 2018-11-05 NOTE — PROGRESS NOTES
SUBJECTIVE:   Kayla Fuentes is a 47 year old female who presents to clinic today for the following health issues:      ED/UC Followup:    Facility:  [facility] urgent care  Date of visit: 10/28/18  Reason for visit: Fever/thought she had influenza  Current Status: Dx with viral URI with cough.  Patient states her symptoms have improved but still coughing up hard yellow bits and in the morning yellow mucous with tinge of blood.  Up all night coughing and sweaty.  Cough is better during the daytime.  R/O bronchitis.  Patient states she used to be a smoker.  No fever.  Taking aspirin       HPI  Patient comes in stating that she started having symptoms of sinusitis October 25.  She went to urgent care on 28th.  She was informed that she probably had a viral sinusitis and symptomatic treatment recommended.  She had fever head congestion and jaw pain at that time.  The fever has gotten better but she still has some nighttime fever.  She has a cough with some expectoration which is new.  She is an ex-smoker quit in 2015.  She is not short of breath.  No history of asthma.  Head and sinus still feel congested.  She has had symptoms now for over 10 days.      Problem list and histories reviewed & adjusted, as indicated.  Additional history: as documented    Current Outpatient Prescriptions   Medication Sig Dispense Refill     Acetaminophen (TYLENOL ARTHRITIS PAIN PO) Take 1,300 mg by mouth 2 times daily       amLODIPine-benazepril (LOTREL) 5-10 MG per capsule Take 1 capsule by mouth daily 90 capsule 3     amoxicillin (AMOXIL) 500 MG capsule Take 1 capsule (500 mg) by mouth 3 times daily for 5 days 15 capsule 0     Cyclobenzaprine HCl (FLEXERIL PO) Take 5 mg by mouth At Bedtime       DiphenhydrAMINE HCl (BENADRYL PO) Take 25 mg by mouth daily as needed for allergies       GABAPENTIN PO Take 100 mg by mouth 3 times daily       OXYCODONE HCL PO Take 5 mg by mouth every 6 hours as needed       SUMAtriptan (IMITREX) 100  "MG tablet Take 1 tablet (100 mg) by mouth at onset of headache for migraine May repeat in 2 hours. Max 2 tablets/24 hours. 9 tablet 2     SUMAtriptan (IMITREX) 50 MG tablet TAKE 1 TABLET BY MOUTH AT ONSET OF MIGRAINE. MAY REPEAT DOSE IN 2 HRS. *MAX 200MG/24 HRS* 9 tablet 3     VIENVA 0.1-20 MG-MCG per tablet Take 1 tablet by mouth daily  3     dextromethorphan-guaiFENesin (MUCINEX DM)  MG per 12 hr tablet Take 1 tablet by mouth every 12 hours       Allergies   Allergen Reactions     Sulfa Drugs GI Disturbance       Reviewed and updated as needed this visit by clinical staff  Tobacco  Allergies  Meds  Med Hx  Surg Hx  Fam Hx  Soc Hx      Reviewed and updated as needed this visit by Provider         ROS:  Constitutional, HEENT, cardiovascular, pulmonary, gi and gu systems are negative, except as otherwise noted.    OBJECTIVE:     /81 (BP Location: Right arm, Patient Position: Sitting, Cuff Size: Adult Regular)  Pulse 66  Temp 98.8  F (37.1  C) (Temporal)  Ht 5' 6\" (1.676 m)  Wt 193 lb 1.6 oz (87.6 kg)  SpO2 98%  BMI 31.17 kg/m2  Body mass index is 31.17 kg/(m^2).  GENERAL: healthy, alert and no distress  HENT: ear canals and TM's normal, nose and mouth without ulcers or lesions. Maxillary and ethmoidal sinus.  NECK: no adenopathy, no asymmetry, masses, or scars and thyroid normal to palpation  RESP: lungs clear to auscultation - no rales, rhonchi or wheezes    Diagnostic Test Results:  none     ASSESSMENT/PLAN:     1.  Acute sinusitis.  Patient had symptoms for over 10 days.  We will treated with amoxicillin 5 mg twice daily for 5 days.  2.  Cough which still could be viral tracheobronchial infection related.      Reynaldo Miller MD  [facility]  "

## 2018-11-05 NOTE — MR AVS SNAPSHOT
After Visit Summary   11/5/2018    Kayla Fuentes    MRN: [id number]           Patient Information     Date Of Birth          1970        Visit Information        Provider Department      11/5/2018 1:10 PM Reynaldo Miller MD [facility]        Today's Diagnoses     Acute recurrent maxillary sinusitis    -  1       Follow-ups after your visit        Follow-up notes from your care team     Return if symptoms worsen or fail to improve.      Your next 10 appointments already scheduled     Nov 26, 2018  8:00 AM CST   LAB with LAB FIRST FLOOR [facility] ([facility])    [address] [place] [id number] [phone]           Please do not eat 10-12 hours before your appointment if you are coming in fasting for labs on lipids, cholesterol, or glucose (sugar). This does not apply to pregnant women. Water, hot tea and black coffee (with nothing added) are okay. Do not drink other fluids, diet soda or chew gum.            Nov 26, 2018  8:30 AM CST   PHYSICAL with Jassi Washington MD   [facility]    [address] [place] [id number] [phone]              Who to contact     If you have questions or need follow up information about today's clinic visit or your schedule please contact [facility] directly at [phone].  Normal or non-critical lab and imaging results will be communicated to you by MyChart, letter or phone within 4 business days after the clinic has received the results. If you do not hear from us within 7 days, please contact the clinic through MyChart or phone. If you have a critical or abnormal lab result, we will notify you by phone as soon as possible.  Submit refill requests through [employer] or call your pharmacy and they will forward the refill request to us. Please allow 3 business days for your refill to be completed.        "   Additional Information About Your Visit        Care EveryWhere ID     This is your Care EveryWhere ID. This could be used by other organizations to access your [place] medical records  [id number]        Your Vitals Were     Pulse Temperature Height Pulse Oximetry BMI (Body Mass Index)       66 98.8  F (37.1  C) (Temporal) 5' 6\" (1.676 m) 98% 31.17 kg/m2        Blood Pressure from Last 3 Encounters:   11/05/18 118/81   10/28/18 117/75   05/24/18 116/84    Weight from Last 3 Encounters:   11/05/18 193 lb 1.6 oz (87.6 kg)   10/28/18 193 lb (87.5 kg)   05/24/18 187 lb 6.4 oz (85 kg)              Today, you had the following     No orders found for display         Today's Medication Changes          These changes are accurate as of 11/5/18  2:04 PM.  If you have any questions, ask your nurse or doctor.               Start taking these medicines.        Dose/Directions    amoxicillin 500 MG capsule   Commonly known as:  AMOXIL   Used for:  Acute recurrent maxillary sinusitis   Started by:  Reynaldo Miller MD        Dose:  500 mg   Take 1 capsule (500 mg) by mouth 3 times daily for 5 days   Quantity:  15 capsule   Refills:  0            Where to get your medicines      These medications were sent to [facility]/pharmacy #[id number] - [address] AT [address] [address]     Phone:  [phone]     amoxicillin 500 MG capsule                Primary Care Provider Office Phone # Fax #    Jassi Washington [phone]       [address]  [facility] [address]        Equal Access to Services     [facility] AH: Hadii alex Arguelles, aniyah ludenise, qaadinata joelaltoño rodney. So [facility] [phone].    ATENCIÓN: Si habla español, tiene a melo disposición servicios gratuitos de asistencia lingüística. Llame al [phone].    We comply with applicable federal civil rights laws and " [place] laws. We do not discriminate on the basis of race, color, national origin, age, disability, sex, sexual orientation, or gender identity.            Thank you!     Thank you for choosing [facility]  for your care. Our goal is always to provide you with excellent care. Hearing back from our patients is one way we can continue to improve our services. Please take a few minutes to complete the written survey that you may receive in the mail after your visit with us. Thank you!             Your Updated Medication List - Protect others around you: Learn how to safely use, store and throw away your medicines at www.disposemymeds.org.          This list is accurate as of 11/5/18  2:04 PM.  Always use your most recent med list.                   Brand Name Dispense Instructions for use Diagnosis    amLODIPine-benazepril 5-10 MG per capsule    LOTREL    90 capsule    Take 1 capsule by mouth daily    Hypertension goal BP (blood pressure) < 140/90       amoxicillin 500 MG capsule    AMOXIL    15 capsule    Take 1 capsule (500 mg) by mouth 3 times daily for 5 days    Acute recurrent maxillary sinusitis       BENADRYL PO      Take 25 mg by mouth daily as needed for allergies        dextromethorphan-guaiFENesin  MG per 12 hr tablet    MUCINEX DM     Take 1 tablet by mouth every 12 hours        FLEXERIL PO      Take 5 mg by mouth At Bedtime        GABAPENTIN PO      Take 100 mg by mouth 3 times daily        OXYCODONE HCL PO      Take 5 mg by mouth every 6 hours as needed        * SUMAtriptan 50 MG tablet    IMITREX    9 tablet    TAKE 1 TABLET BY MOUTH AT ONSET OF MIGRAINE. MAY REPEAT DOSE IN 2 HRS. *MAX 200MG/24 HRS*    Intractable migraine with aura without status migrainosus       * SUMAtriptan 100 MG tablet    IMITREX    9 tablet    Take 1 tablet (100 mg) by mouth at onset of headache for migraine May repeat in 2 hours. Max 2 tablets/24 hours.    Intractable migraine with aura without status  migrainosus       TYLENOL ARTHRITIS PAIN PO      Take 1,300 mg by mouth 2 times daily        VIENVA 0.1-20 MG-MCG per tablet   Generic drug:  levonorgestrel-ethinyl estradiol      Take 1 tablet by mouth daily        * Notice:  This list has 2 medication(s) that are the same as other medications prescribed for you. Read the directions carefully, and ask your doctor or other care provider to review them with you.

## 2018-11-18 DIAGNOSIS — R80.9 MICROALBUMINURIA: ICD-10-CM

## 2018-11-18 DIAGNOSIS — Z13.1 SCREENING FOR DIABETES MELLITUS (DM): ICD-10-CM

## 2018-11-18 DIAGNOSIS — Z13.0 SCREENING FOR DEFICIENCY ANEMIA: ICD-10-CM

## 2018-11-18 DIAGNOSIS — E78.5 HYPERLIPIDEMIA LDL GOAL <160: ICD-10-CM

## 2018-11-18 DIAGNOSIS — R73.01 ELEVATED FASTING BLOOD SUGAR: ICD-10-CM

## 2018-11-18 DIAGNOSIS — I10 HYPERTENSION GOAL BP (BLOOD PRESSURE) < 140/90: ICD-10-CM

## 2018-11-18 DIAGNOSIS — Z00.00 ROUTINE GENERAL MEDICAL EXAMINATION AT A HEALTH CARE FACILITY: Primary | ICD-10-CM

## 2018-11-26 ENCOUNTER — OFFICE VISIT (OUTPATIENT)
Dept: PEDIATRICS | Facility: CLINIC | Age: 48
End: 2018-11-26
Payer: COMMERCIAL

## 2018-11-26 VITALS
SYSTOLIC BLOOD PRESSURE: 107 MMHG | BODY MASS INDEX: 30.65 KG/M2 | DIASTOLIC BLOOD PRESSURE: 75 MMHG | HEART RATE: 69 BPM | WEIGHT: 195.3 LBS | HEIGHT: 67 IN | OXYGEN SATURATION: 98 % | TEMPERATURE: 97.7 F

## 2018-11-26 DIAGNOSIS — R74.8 ELEVATED LIVER ENZYMES: ICD-10-CM

## 2018-11-26 DIAGNOSIS — R73.01 ELEVATED FASTING BLOOD SUGAR: ICD-10-CM

## 2018-11-26 DIAGNOSIS — J30.89 SEASONAL ALLERGIC RHINITIS DUE TO OTHER ALLERGIC TRIGGER: ICD-10-CM

## 2018-11-26 DIAGNOSIS — I10 HYPERTENSION GOAL BP (BLOOD PRESSURE) < 140/90: ICD-10-CM

## 2018-11-26 DIAGNOSIS — R80.9 MICROALBUMINURIA: ICD-10-CM

## 2018-11-26 DIAGNOSIS — Z13.1 SCREENING FOR DIABETES MELLITUS (DM): ICD-10-CM

## 2018-11-26 DIAGNOSIS — Z98.1 STATUS POST LUMBAR SPINAL FUSION: ICD-10-CM

## 2018-11-26 DIAGNOSIS — M26.609 TMJ (TEMPOROMANDIBULAR JOINT SYNDROME): ICD-10-CM

## 2018-11-26 DIAGNOSIS — Z00.00 ROUTINE GENERAL MEDICAL EXAMINATION AT A HEALTH CARE FACILITY: ICD-10-CM

## 2018-11-26 DIAGNOSIS — E78.5 HYPERLIPIDEMIA LDL GOAL <160: ICD-10-CM

## 2018-11-26 DIAGNOSIS — Z00.00 ROUTINE GENERAL MEDICAL EXAMINATION AT A HEALTH CARE FACILITY: Primary | ICD-10-CM

## 2018-11-26 DIAGNOSIS — E66.9 OBESITY (BMI 30-39.9): ICD-10-CM

## 2018-11-26 DIAGNOSIS — Z13.6 CARDIOVASCULAR SCREENING; LDL GOAL LESS THAN 160: ICD-10-CM

## 2018-11-26 DIAGNOSIS — G43.119 INTRACTABLE MIGRAINE WITH AURA WITHOUT STATUS MIGRAINOSUS: ICD-10-CM

## 2018-11-26 LAB
ALBUMIN SERPL-MCNC: 3.5 G/DL (ref 3.4–5)
ALP SERPL-CCNC: 70 U/L (ref 40–150)
ALT SERPL W P-5'-P-CCNC: 23 U/L (ref 0–50)
ANION GAP SERPL CALCULATED.3IONS-SCNC: 6 MMOL/L (ref 3–14)
AST SERPL W P-5'-P-CCNC: 16 U/L (ref 0–45)
BASOPHILS # BLD AUTO: 0 10E9/L (ref 0–0.2)
BASOPHILS NFR BLD AUTO: 0.4 %
BILIRUB SERPL-MCNC: 0.2 MG/DL (ref 0.2–1.3)
BUN SERPL-MCNC: 11 MG/DL (ref 7–30)
CALCIUM SERPL-MCNC: 8.4 MG/DL (ref 8.5–10.1)
CHLORIDE SERPL-SCNC: 106 MMOL/L (ref 94–109)
CHOLEST SERPL-MCNC: 230 MG/DL
CO2 SERPL-SCNC: 26 MMOL/L (ref 20–32)
CREAT SERPL-MCNC: 0.86 MG/DL (ref 0.52–1.04)
CREAT UR-MCNC: 226 MG/DL
DIFFERENTIAL METHOD BLD: NORMAL
EOSINOPHIL # BLD AUTO: 0.2 10E9/L (ref 0–0.7)
EOSINOPHIL NFR BLD AUTO: 2.7 %
ERYTHROCYTE [DISTWIDTH] IN BLOOD BY AUTOMATED COUNT: 12.8 % (ref 10–15)
GFR SERPL CREATININE-BSD FRML MDRD: 71 ML/MIN/1.7M2
GLUCOSE SERPL-MCNC: 102 MG/DL (ref 70–99)
HBA1C MFR BLD: 5.6 % (ref 0–5.6)
HCT VFR BLD AUTO: 42 % (ref 35–47)
HDLC SERPL-MCNC: 55 MG/DL
HGB BLD-MCNC: 13.7 G/DL (ref 11.7–15.7)
IMM GRANULOCYTES # BLD: 0 10E9/L (ref 0–0.4)
IMM GRANULOCYTES NFR BLD: 0.2 %
LDLC SERPL CALC-MCNC: 159 MG/DL
LYMPHOCYTES # BLD AUTO: 2.8 10E9/L (ref 0.8–5.3)
LYMPHOCYTES NFR BLD AUTO: 34.2 %
MCH RBC QN AUTO: 29.9 PG (ref 26.5–33)
MCHC RBC AUTO-ENTMCNC: 32.6 G/DL (ref 31.5–36.5)
MCV RBC AUTO: 92 FL (ref 78–100)
MICROALBUMIN UR-MCNC: 45 MG/L
MICROALBUMIN/CREAT UR: 20.04 MG/G CR (ref 0–25)
MONOCYTES # BLD AUTO: 0.6 10E9/L (ref 0–1.3)
MONOCYTES NFR BLD AUTO: 7.1 %
NEUTROPHILS # BLD AUTO: 4.5 10E9/L (ref 1.6–8.3)
NEUTROPHILS NFR BLD AUTO: 55.4 %
NONHDLC SERPL-MCNC: 175 MG/DL
PLATELET # BLD AUTO: 294 10E9/L (ref 150–450)
POTASSIUM SERPL-SCNC: 4 MMOL/L (ref 3.4–5.3)
PROT SERPL-MCNC: 7.4 G/DL (ref 6.8–8.8)
RBC # BLD AUTO: 4.58 10E12/L (ref 3.8–5.2)
SODIUM SERPL-SCNC: 138 MMOL/L (ref 133–144)
TRIGL SERPL-MCNC: 81 MG/DL
WBC # BLD AUTO: 8.2 10E9/L (ref 4–11)

## 2018-11-26 PROCEDURE — 83036 HEMOGLOBIN GLYCOSYLATED A1C: CPT | Performed by: FAMILY MEDICINE

## 2018-11-26 PROCEDURE — 80053 COMPREHEN METABOLIC PANEL: CPT | Performed by: FAMILY MEDICINE

## 2018-11-26 PROCEDURE — 80061 LIPID PANEL: CPT | Performed by: FAMILY MEDICINE

## 2018-11-26 PROCEDURE — 82043 UR ALBUMIN QUANTITATIVE: CPT | Performed by: FAMILY MEDICINE

## 2018-11-26 PROCEDURE — 36415 COLL VENOUS BLD VENIPUNCTURE: CPT | Performed by: FAMILY MEDICINE

## 2018-11-26 PROCEDURE — 99396 PREV VISIT EST AGE 40-64: CPT | Performed by: FAMILY MEDICINE

## 2018-11-26 PROCEDURE — 85025 COMPLETE CBC W/AUTO DIFF WBC: CPT | Performed by: FAMILY MEDICINE

## 2018-11-26 RX ORDER — AMLODIPINE AND BENAZEPRIL HYDROCHLORIDE 5; 10 MG/1; MG/1
1 CAPSULE ORAL DAILY
Qty: 90 CAPSULE | Refills: 3 | Status: SHIPPED | OUTPATIENT
Start: 2018-11-26 | End: 2019-06-03

## 2018-11-26 ASSESSMENT — PAIN SCALES - GENERAL: PAINLEVEL: MILD PAIN (3)

## 2018-11-26 NOTE — PATIENT INSTRUCTIONS
Schedule for recheck in 6 months    Start on flonase sprays daily      Preventive Health Recommendations  Female Ages 40 to 49    Yearly exam:     See your health care provider every year in order to  1. Review health changes.   2. Discuss preventive care.    3. Review your medicines if your doctor prescribed any.      Get a Pap test every three years (unless you have an abnormal result and your provider advises testing more often).      If you get Pap tests with HPV test, you only need to test every 5 years, unless you have an abnormal result. You do not need a Pap test if your uterus was removed (hysterectomy) and you have not had cancer.      You should be tested each year for STDs (sexually transmitted diseases), if you're at risk.     Ask your doctor if you should have a mammogram.      Have a colonoscopy (test for colon cancer) if someone in your family has had colon cancer or polyps before age 50.       Have a cholesterol test every 5 years.       Have a diabetes test (fasting glucose) after age 45. If you are at risk for diabetes, you should have this test every 3 years.    Shots: Get a flu shot each year. Get a tetanus shot every 10 years.     Nutrition:     Eat at least 5 servings of fruits and vegetables each day.    Eat whole-grain bread, whole-wheat pasta and brown rice instead of white grains and rice.    Get adequate Calcium and Vitamin D.      Lifestyle    Exercise at least 150 minutes a week (an average of 30 minutes a day, 5 days a week). This will help you control your weight and prevent disease.    Limit alcohol to one drink per day.    No smoking.     Wear sunscreen to prevent skin cancer.    See your dentist every six months for an exam and cleaning.

## 2018-11-26 NOTE — PROGRESS NOTES
SUBJECTIVE:   CC: Kayla Fuentes is an 48 year old woman who presents for preventive health visit.     Healthy Habits:    Do you get at least three servings of calcium containing foods daily (dairy, green leafy vegetables, etc.)? yes and no, taking calcium and/or vitamin D supplement: yes     Amount of exercise or daily activities, outside of work: 3-4 day(s) per week    Problems taking medications regularly No    Medication side effects: Yes, EXCESSIVE THIRST FROM FLEXERIL AND HEADACHES FROM OXYCODONE    Have you had an eye exam in the past two years? yes    Do you see a dentist twice per year? yes    Do you have sleep apnea, excessive snoring or daytime drowsiness?no      Hypertension Follow-up      Outpatient blood pressures are not being checked.    Low Salt Diet: not monitoring salt      Today's PHQ-2 Score:   PHQ-2 ( 1999 Pfizer) 11/26/2018 8/4/2017   Q1: Little interest or pleasure in doing things 0 0   Q2: Feeling down, depressed or hopeless 0 0   PHQ-2 Score 0 0     Abuse: Current or Past(Physical, Sexual or Emotional)- No  Do you feel safe in your environment - Yes    Social History   Substance Use Topics     Smoking status: Former Smoker     Quit date: 3/15/2015     Smokeless tobacco: Never Used     Alcohol use Yes     If you drink alcohol do you typically have >3 drinks per day or >7 drinks per week? No                     Reviewed orders with patient.  Reviewed health maintenance and updated orders accordingly - Yes  Labs reviewed in EPIC  BP Readings from Last 3 Encounters:   11/26/18 107/75   11/05/18 118/81   10/28/18 117/75    Wt Readings from Last 3 Encounters:   11/26/18 195 lb 4.8 oz (88.6 kg)   11/05/18 193 lb 1.6 oz (87.6 kg)   10/28/18 193 lb (87.5 kg)                  Patient Active Problem List   Diagnosis     Hypertension goal BP (blood pressure) < 140/90     Intractable migraine with aura without status migrainosus     Chronic low back pain     TMJ (temporomandibular joint syndrome)      Obesity (BMI 30-39.9)     Microalbuminuria     Acute right flank pain     History of renal calculi     Elevated liver enzymes     Chronic bilateral low back pain without sciatica     Elevated fasting blood sugar     Status post lumbar spinal fusion     CARDIOVASCULAR SCREENING; LDL GOAL LESS THAN 160     Seasonal allergic rhinitis due to other allergic trigger     History reviewed. No pertinent surgical history.    Social History   Substance Use Topics     Smoking status: Former Smoker     Quit date: 3/15/2015     Smokeless tobacco: Never Used     Alcohol use Yes     History reviewed. No pertinent family history.      Current Outpatient Prescriptions   Medication Sig Dispense Refill     Acetaminophen (TYLENOL ARTHRITIS PAIN PO) Take 1,300 mg by mouth 2 times daily       amLODIPine-benazepril (LOTREL) 5-10 MG per capsule Take 1 capsule by mouth daily 90 capsule 3     Cyclobenzaprine HCl (FLEXERIL PO) Take 5 mg by mouth At Bedtime       DiphenhydrAMINE HCl (BENADRYL PO) Take 25 mg by mouth daily as needed for allergies       OXYCODONE HCL PO Take 5 mg by mouth every 6 hours as needed       SUMAtriptan (IMITREX) 100 MG tablet Take 1 tablet (100 mg) by mouth at onset of headache for migraine May repeat in 2 hours. Max 2 tablets/24 hours. 9 tablet 2     VIENVA 0.1-20 MG-MCG per tablet Take 1 tablet by mouth daily  3     dextromethorphan-guaiFENesin (MUCINEX DM)  MG per 12 hr tablet Take 1 tablet by mouth every 12 hours       GABAPENTIN PO Take 100 mg by mouth 3 times daily       [DISCONTINUED] amLODIPine-benazepril (LOTREL) 5-10 MG per capsule Take 1 capsule by mouth daily 90 capsule 3     Allergies   Allergen Reactions     Sulfa Drugs GI Disturbance     Recent Labs   Lab Test  11/26/18   0803  10/18/17   1629  08/08/17   0729  11/17/16   1427  04/25/16   0738 02/27/14   A1C  5.6   --    --    --    --    --    LDL  159*   --   183*   --   148*  156   HDL  55   --   47*   --   44*  50   TRIG  81   --   82   --    "114  90   ALT  23   --   23  80*   --   11   CR  0.86  1.03  0.96  0.98  0.91  1.00   GFRESTIMATED  71  58*  63  61  67  69   GFRESTBLACK  86  70  76  74  81  80   POTASSIUM  4.0  3.7  4.1  4.4  4.0  4.0   TSH   --    --   0.88   --    --   2.15        Patient under age 50, mutual decision reflected in health maintenance.      Pertinent mammograms are reviewed under the imaging tab.  History of abnormal Pap smear: NO - age 30- 65 PAP every 3 years recommended     Reviewed and updated as needed this visit by clinical staff  Tobacco  Allergies  Meds  Med Hx  Surg Hx  Fam Hx  Soc Hx        Reviewed and updated as needed this visit by Provider          History reviewed. No pertinent past medical history.   History reviewed. No pertinent surgical history.  Obstetric History     No data available          ROS:  CONSTITUTIONAL: NEGATIVE for fever, chills, change in weight  INTEGUMENTARU/SKIN: NEGATIVE for worrisome rashes, moles or lesions  EYES: NEGATIVE for vision changes or irritation  ENT: Intermittent stuffy nose from allergy exposure  RESP: NEGATIVE for significant cough or SOB  BREAST: NEGATIVE for masses, tenderness or discharge  CV: NEGATIVE for chest pain, palpitations or peripheral edema  CV: Hx HTN  GI: NEGATIVE for nausea, abdominal pain, heartburn, or change in bowel habits  : NEGATIVE for unusual urinary or vaginal symptoms. Periods are regular.  MUSCULOSKELETAL: NEGATIVE for significant arthralgias or myalgia  NEURO: NEGATIVE for weakness, dizziness or paresthesias  NEURO: Hx headaches-migraine  ENDOCRINE: NEGATIVE for temperature intolerance, skin/hair changes  HEME/ALLERGY/IMMUNE: NEGATIVE for bleeding problems  PSYCHIATRIC: NEGATIVE for changes in mood or affect    OBJECTIVE:   /75 (BP Location: Right arm, Patient Position: Sitting, Cuff Size: Adult Large)  Pulse 69  Temp 97.7  F (36.5  C) (Oral)  Ht 5' 6.5\" (1.689 m)  Wt 195 lb 4.8 oz (88.6 kg)  LMP 11/02/2018 (Exact Date)  SpO2 98% "  Breastfeeding? No  BMI 31.05 kg/m2  EXAM:  GENERAL: healthy, alert and no distress  EYES: Eyes grossly normal to inspection, PERRL and conjunctivae and sclerae normal  HENT: ear canals and TM's normal, nose and mouth without ulcers or lesions  NECK: no adenopathy, no asymmetry, masses, or scars and thyroid normal to palpation  RESP: lungs clear to auscultation - no rales, rhonchi or wheezes  BREAST: normal without masses, tenderness or nipple discharge and no palpable axillary masses or adenopathy  CV: regular rate and rhythm, normal S1 S2, no S3 or S4, no murmur, click or rub, no peripheral edema and peripheral pulses strong  ABDOMEN: soft, nontender, no hepatosplenomegaly, no masses and bowel sounds normal   (female): Deferred, patient sees OB[facility]GYN at [facility]  MS: no gross musculoskeletal defects noted, no edema  SKIN: no suspicious lesions or rashes  NEURO: Normal strength and tone, mentation intact and speech normal  PSYCH: mentation appears normal, affect normal/bright    Diagnostic Test Results:  Results for orders placed or performed in visit on 11/26/18 (from the past 24 hour(s))   CBC with platelets differential   Result Value Ref Range    WBC 8.2 4.0 - 11.0 10e9/L    RBC Count 4.58 3.8 - 5.2 10e12/L    Hemoglobin 13.7 11.7 - 15.7 g/dL    Hematocrit 42.0 35.0 - 47.0 %    MCV 92 78 - 100 fl    MCH 29.9 26.5 - 33.0 pg    MCHC 32.6 31.5 - 36.5 g/dL    RDW 12.8 10.0 - 15.0 %    Platelet Count 294 150 - 450 10e9/L    Diff Method Automated Method     % Neutrophils 55.4 %    % Lymphocytes 34.2 %    % Monocytes 7.1 %    % Eosinophils 2.7 %    % Basophils 0.4 %    % Immature Granulocytes 0.2 %    Absolute Neutrophil 4.5 1.6 - 8.3 10e9/L    Absolute Lymphocytes 2.8 0.8 - 5.3 10e9/L    Absolute Monocytes 0.6 0.0 - 1.3 10e9/L    Absolute Eosinophils 0.2 0.0 - 0.7 10e9/L    Absolute Basophils 0.0 0.0 - 0.2 10e9/L    Abs Immature Granulocytes 0.0 0 - 0.4 10e9/L   **Comprehensive metabolic panel FUTURE anytime    Result Value Ref Range    Sodium 138 133 - 144 mmol/L    Potassium 4.0 3.4 - 5.3 mmol/L    Chloride 106 94 - 109 mmol/L    Carbon Dioxide 26 20 - 32 mmol/L    Anion Gap 6 3 - 14 mmol/L    Glucose 102 (H) 70 - 99 mg/dL    Urea Nitrogen 11 7 - 30 mg/dL    Creatinine 0.86 0.52 - 1.04 mg/dL    GFR Estimate 71 >60 mL/min/1.7m2    GFR Estimate If Black 86 >60 mL/min/1.7m2    Calcium 8.4 (L) 8.5 - 10.1 mg/dL    Bilirubin Total 0.2 0.2 - 1.3 mg/dL    Albumin 3.5 3.4 - 5.0 g/dL    Protein Total 7.4 6.8 - 8.8 g/dL    Alkaline Phosphatase 70 40 - 150 U/L    ALT 23 0 - 50 U/L    AST 16 0 - 45 U/L   Lipid panel reflex to direct LDL Fasting   Result Value Ref Range    Cholesterol 230 (H) <200 mg/dL    Triglycerides 81 <150 mg/dL    HDL Cholesterol 55 >49 mg/dL    LDL Cholesterol Calculated 159 (H) <100 mg/dL    Non HDL Cholesterol 175 (H) <130 mg/dL   **A1C FUTURE anytime   Result Value Ref Range    Hemoglobin A1C 5.6 0 - 5.6 %       ASSESSMENT/PLAN:   1. Routine general medical examination at a health care facility  Discussed on regular exercises, daily calcium intake, healthy eating, self breast exams monthly and routine dental checks    2. Hypertension goal BP (blood pressure) < 140/90  BP Readings from Last 6 Encounters:   11/26/18 107/75   11/05/18 118/81   10/28/18 117/75   05/24/18 116/84   05/16/18 108/70   10/18/17 112/76       Blood pressure is at goal, continue with Lotrel, Will follow low salt diet, weight loss and regular exercises.  Recheck in 6 months or sooner if needed  - amLODIPine-benazepril (LOTREL) 5-10 MG per capsule; Take 1 capsule by mouth daily  Dispense: 90 capsule; Refill: 3    3. Elevated fasting blood sugar  .  Glucose   Date Value Ref Range Status   11/26/2018 102 (H) 70 - 99 mg/dL Final     Comment:     Fasting specimen   10/18/2017 81 70 - 99 mg/dL Final     Comment:     Non Fasting   08/08/2017 105 (H) 70 - 99 mg/dL Final     Comment:     Fasting specimen   11/17/2016 92 70 - 99 mg/dL Final    04/25/2016 97 70 - 99 mg/dL Final     Reviewed normal A1c and slightly elevated fasting blood sugar  Continue with weight loss efforts, healthy eating, regular exercises.  Lab Results   Component Value Date    A1C 5.6 11/26/2018         4. CARDIOVASCULAR SCREENING; LDL GOAL LESS THAN 160  LDL Cholesterol Calculated   Date Value Ref Range Status   11/26/2018 159 (H) <100 mg/dL Final     Comment:     Above desirable:  100-129 mg/dl  Borderline High:  130-159 mg/dL  High:             160-189 mg/dL  Very high:       >189 mg/dl       The 10-year ASCVD risk score (Chana DOZIER Jr, et al., 2013) is: 1.4%    Values used to calculate the score:      Age: 48 years      Sex: Female      Is Non- : Yes      Diabetic: No      Tobacco smoker: No      Systolic Blood Pressure: 107 mmHg      Is BP treated: Yes      HDL Cholesterol: 55 mg/dL      Total Cholesterol: 230 mg/dL      5. Elevated liver enzymes  Liver Function Studies -   Recent Labs   Lab Test  11/26/18   0803   PROTTOTAL  7.4   ALBUMIN  3.5   BILITOTAL  0.2   ALKPHOS  70   AST  16   ALT  23         Negative test results reviewed with the patient and reassured.      6. Microalbuminuria  Continue to control hypertension, continue ACE inhibitor, low-salt diet, avoid NSAID use, monitor.    7. TMJ (temporomandibular joint syndrome)  Feeling better with daily use of Flexeril at night, continue with EMG rehab exercises    8. Intractable migraine with aura without status migrainosus  Stable, continue with Imitrex as needed for episodic migraine, continue to avoid potential triggers for migraine    9. Obesity (BMI 30-39.9)  Wt Readings from Last 5 Encounters:   11/26/18 195 lb 4.8 oz (88.6 kg)   11/05/18 193 lb 1.6 oz (87.6 kg)   10/28/18 193 lb (87.5 kg)   05/24/18 187 lb 6.4 oz (85 kg)   05/16/18 192 lb 1.6 oz (87.1 kg)     Emphasized on weight loss, portion control, low calorie and low fat diet, healthy eating, regular exercises.      10. Status post lumbar  "spinal fusion  Patient is doing well with the current pain management program and rehab exercises    11. Seasonal allergic rhinitis due to other allergic trigger  Recommended to try over-the-counter antihistamines as needed  Patient denies history of smoking or asthma.      COUNSELING:   Reviewed preventive health counseling, as reflected in patient instructions  Special attention given to:        Regular exercise       Healthy diet/nutrition       Vision screening       Osteoporosis Prevention/Bone Health       (Anni)menopause management       The 10-year ASCVD risk score (Chana DOZIER Jr, et al., 2013) is: 1.4%    Values used to calculate the score:      Age: 48 years      Sex: Female      Is Non- : Yes      Diabetic: No      Tobacco smoker: No      Systolic Blood Pressure: 107 mmHg      Is BP treated: Yes      HDL Cholesterol: 55 mg/dL      Total Cholesterol: 230 mg/dL    BP Readings from Last 1 Encounters:   11/26/18 107/75     Estimated body mass index is 31.05 kg/(m^2) as calculated from the following:    Height as of this encounter: 5' 6.5\" (1.689 m).    Weight as of this encounter: 195 lb 4.8 oz (88.6 kg).      Weight management plan: Discussed healthy diet and exercise guidelines and patient will follow up in 6 months in clinic to re-evaluate.     reports that she quit smoking about 3 years ago. She has never used smokeless tobacco.      Counseling Resources:  ATP IV Guidelines  Pooled Cohorts Equation Calculator  Breast Cancer Risk Calculator  FRAX Risk Assessment  ICSI Preventive Guidelines  Dietary Guidelines for Americans, 2010  USDA's MyPlate  ASA Prophylaxis  Lung CA Screening    Jassi Washington MD  [facility]  Chart documentation done in part with Dragon Voice recognition Software. Although reviewed after completion, some word and grammatical error may remain.    "

## 2018-11-26 NOTE — MR AVS SNAPSHOT
After Visit Summary   11/26/2018    Kayla Fuentes    MRN: [id number]           Patient Information     Date Of Birth          1970        Visit Information        Provider Department      11/26/2018 8:30 AM Jassi Washington MD [facility]        Today's Diagnoses     Routine general medical examination at a health care facility    -  1    Hypertension goal BP (blood pressure) < 140/90        Elevated fasting blood sugar        CARDIOVASCULAR SCREENING; LDL GOAL LESS THAN 160        Elevated liver enzymes        Microalbuminuria        TMJ (temporomandibular joint syndrome)        Intractable migraine with aura without status migrainosus        Obesity (BMI 30-39.9)        Status post lumbar spinal fusion        Seasonal allergic rhinitis due to other allergic trigger          Care Instructions    Schedule for recheck in 6 months    Start on flonase sprays daily      Preventive Health Recommendations  Female Ages 40 to 49    Yearly exam:     See your health care provider every year in order to  1. Review health changes.   2. Discuss preventive care.    3. Review your medicines if your doctor prescribed any.      Get a Pap test every three years (unless you have an abnormal result and your provider advises testing more often).      If you get Pap tests with HPV test, you only need to test every 5 years, unless you have an abnormal result. You do not need a Pap test if your uterus was removed (hysterectomy) and you have not had cancer.      You should be tested each year for STDs (sexually transmitted diseases), if you're at risk.     Ask your doctor if you should have a mammogram.      Have a colonoscopy (test for colon cancer) if someone in your family has had colon cancer or polyps before age 50.       Have a cholesterol test every 5 years.       Have a diabetes test (fasting glucose) after age 45. If you are at risk for diabetes, you should have this test every 3  "years.    Shots: Get a flu shot each year. Get a tetanus shot every 10 years.     Nutrition:     Eat at least 5 servings of fruits and vegetables each day.    Eat whole-grain bread, whole-wheat pasta and brown rice instead of white grains and rice.    Get adequate Calcium and Vitamin D.      Lifestyle    Exercise at least 150 minutes a week (an average of 30 minutes a day, 5 days a week). This will help you control your weight and prevent disease.    Limit alcohol to one drink per day.    No smoking.     Wear sunscreen to prevent skin cancer.    See your dentist every six months for an exam and cleaning.          Follow-ups after your visit        Who to contact     If you have questions or need follow up information about today's clinic visit or your schedule please contact [facility] directly at [phone].  Normal or non-critical lab and imaging results will be communicated to you by MyChart, letter or phone within 4 business days after the clinic has received the results. If you do not hear from us within 7 days, please contact the clinic through MyChart or phone. If you have a critical or abnormal lab result, we will notify you by phone as soon as possible.  Submit refill requests through St[employer] or call your pharmacy and they will forward the refill request to us. Please allow 3 business days for your refill to be completed.          Additional Information About Your Visit        Care EveryWhere ID     This is your Care EveryWhere ID. This could be used by other organizations to access your [place] medical records  [id number]        Your Vitals Were     Pulse Temperature Height Last Period Pulse Oximetry Breastfeeding?    69 97.7  F (36.5  C) (Oral) 5' 6.5\" (1.689 m) 11/02/2018 (Exact Date) 98% No    BMI (Body Mass Index)                   31.05 kg/m2            Blood Pressure from Last 3 Encounters:   11/26/18 107/75   11/05/18 118/81   10/28/18 117/75    Weight from Last 3 Encounters: "   11/26/18 195 lb 4.8 oz (88.6 kg)   11/05/18 193 lb 1.6 oz (87.6 kg)   10/28/18 193 lb (87.5 kg)              Today, you had the following     No orders found for display         Today's Medication Changes          These changes are accurate as of 11/26/18  8:57 AM.  If you have any questions, ask your nurse or doctor.               These medicines have changed or have updated prescriptions.        Dose/Directions    SUMAtriptan 100 MG tablet   Commonly known as:  IMITREX   This may have changed:  Another medication with the same name was removed. Continue taking this medication, and follow the directions you see here.   Used for:  Intractable migraine with aura without status migrainosus   Changed by:  Jassi Washington MD        Dose:  100 mg   Take 1 tablet (100 mg) by mouth at onset of headache for migraine May repeat in 2 hours. Max 2 tablets/24 hours.   Quantity:  9 tablet   Refills:  2            Where to get your medicines      These medications were sent to [facility]/pharmacy #[id number] - [facility] [address] [place], [place] AT [address] [address]     Phone:  [phone]     amLODIPine-benazepril 5-10 MG per capsule                Primary Care Provider Office Phone # Fax #    Jassi Washington [phone] [address] [facility] [address]        Equal Access to Services     [facility] AH: Hadii aad ku hadasho Soomaali, waaxda luqadaha, qaybta kaalmada adeegyada, toño hightower hayhunter heaton . So [facility] [phone].    ATENCIÓN: Si habla español, tiene a melo disposición servicios gratuitos de asistencia lingüística. Llame al [phone].    We comply with applicable federal civil rights laws and [place] laws. We do not discriminate on the basis of race, color, national origin, age, disability, sex, sexual orientation, or gender identity.            Thank you!     Thank you for choosing [facility]   for your care. Our goal is always to provide you with excellent care. Hearing back from our patients is one way we can continue to improve our services. Please take a few minutes to complete the written survey that you may receive in the mail after your visit with us. Thank you!             Your Updated Medication List - Protect others around you: Learn how to safely use, store and throw away your medicines at www.disposemymeds.org.          This list is accurate as of 11/26/18  8:57 AM.  Always use your most recent med list.                   Brand Name Dispense Instructions for use Diagnosis    amLODIPine-benazepril 5-10 MG per capsule    LOTREL    90 capsule    Take 1 capsule by mouth daily    Hypertension goal BP (blood pressure) < 140/90       BENADRYL PO      Take 25 mg by mouth daily as needed for allergies        dextromethorphan-guaiFENesin  MG per 12 hr tablet    MUCINEX DM     Take 1 tablet by mouth every 12 hours        FLEXERIL PO      Take 5 mg by mouth At Bedtime        GABAPENTIN PO      Take 100 mg by mouth 3 times daily        OXYCODONE HCL PO      Take 5 mg by mouth every 6 hours as needed        SUMAtriptan 100 MG tablet    IMITREX    9 tablet    Take 1 tablet (100 mg) by mouth at onset of headache for migraine May repeat in 2 hours. Max 2 tablets/24 hours.    Intractable migraine with aura without status migrainosus       TYLENOL ARTHRITIS PAIN PO      Take 1,300 mg by mouth 2 times daily        VIENVA 0.1-20 MG-MCG per tablet   Generic drug:  levonorgestrel-ethinyl estradiol      Take 1 tablet by mouth daily

## 2019-01-16 DIAGNOSIS — Z91.018 ALLERGY TO OTHER FOODS: Primary | ICD-10-CM

## 2019-01-16 PROCEDURE — 86003 ALLG SPEC IGE CRUDE XTRC EA: CPT | Performed by: PHYSICIAN ASSISTANT

## 2019-01-16 PROCEDURE — 36415 COLL VENOUS BLD VENIPUNCTURE: CPT | Performed by: PHYSICIAN ASSISTANT

## 2019-01-17 LAB
ALMOND IGE QN: <0.1 KU(A)/L
CASHEW NUT IGE QN: <0.1 KU(A)/L
HAZELNUT IGE QN: <0.1 KU(A)/L
PECAN/HICK NUT IGE QN: <0.1 KU(A)/L
PISTACHIO IGE QN: <0.1 KU(A)/L
WALNUT IGE QN: <0.1 KU(A)/L

## 2019-03-04 ENCOUNTER — MYC REFILL (OUTPATIENT)
Dept: PEDIATRICS | Facility: CLINIC | Age: 49
End: 2019-03-04

## 2019-03-04 DIAGNOSIS — G43.119 INTRACTABLE MIGRAINE WITH AURA WITHOUT STATUS MIGRAINOSUS: ICD-10-CM

## 2019-03-04 RX ORDER — SUMATRIPTAN 100 MG/1
100 TABLET, FILM COATED ORAL
Qty: 9 TABLET | Refills: 0 | Status: SHIPPED | OUTPATIENT
Start: 2019-03-04 | End: 2019-06-03

## 2019-05-20 ENCOUNTER — TRANSFERRED RECORDS (OUTPATIENT)
Dept: HEALTH INFORMATION MANAGEMENT | Facility: CLINIC | Age: 49
End: 2019-05-20

## 2019-06-03 ENCOUNTER — OFFICE VISIT (OUTPATIENT)
Dept: PEDIATRICS | Facility: CLINIC | Age: 49
End: 2019-06-03
Payer: COMMERCIAL

## 2019-06-03 ENCOUNTER — ANCILLARY PROCEDURE (OUTPATIENT)
Dept: MAMMOGRAPHY | Facility: CLINIC | Age: 49
End: 2019-06-03
Attending: FAMILY MEDICINE
Payer: COMMERCIAL

## 2019-06-03 VITALS
WEIGHT: 196.8 LBS | HEIGHT: 67 IN | OXYGEN SATURATION: 98 % | TEMPERATURE: 98.5 F | HEART RATE: 71 BPM | BODY MASS INDEX: 30.89 KG/M2 | DIASTOLIC BLOOD PRESSURE: 65 MMHG | SYSTOLIC BLOOD PRESSURE: 96 MMHG

## 2019-06-03 DIAGNOSIS — G43.119 INTRACTABLE MIGRAINE WITH AURA WITHOUT STATUS MIGRAINOSUS: ICD-10-CM

## 2019-06-03 DIAGNOSIS — I10 HYPERTENSION GOAL BP (BLOOD PRESSURE) < 140/90: Primary | ICD-10-CM

## 2019-06-03 DIAGNOSIS — Z12.39 BREAST CANCER SCREENING: ICD-10-CM

## 2019-06-03 PROCEDURE — 77067 SCR MAMMO BI INCL CAD: CPT

## 2019-06-03 PROCEDURE — 99214 OFFICE O/P EST MOD 30 MIN: CPT | Performed by: FAMILY MEDICINE

## 2019-06-03 PROCEDURE — 77063 BREAST TOMOSYNTHESIS BI: CPT

## 2019-06-03 RX ORDER — SUMATRIPTAN 100 MG/1
100 TABLET, FILM COATED ORAL
Qty: 9 TABLET | Refills: 3 | Status: SHIPPED | OUTPATIENT
Start: 2019-06-03 | End: 2019-12-06

## 2019-06-03 RX ORDER — AMLODIPINE AND BENAZEPRIL HYDROCHLORIDE 5; 10 MG/1; MG/1
1 CAPSULE ORAL DAILY
Qty: 90 CAPSULE | Refills: 3 | Status: SHIPPED | OUTPATIENT
Start: 2019-06-03 | End: 2019-12-06

## 2019-06-03 RX ORDER — TOPIRAMATE 25 MG/1
TABLET, FILM COATED ORAL
Qty: 180 TABLET | Refills: 1 | Status: SHIPPED | OUTPATIENT
Start: 2019-06-03 | End: 2019-11-22

## 2019-06-03 RX ORDER — EPINEPHRINE 0.3 MG/.3ML
INJECTION SUBCUTANEOUS
Refills: 2 | COMMUNITY
Start: 2019-01-16

## 2019-06-03 ASSESSMENT — ANXIETY QUESTIONNAIRES
GAD7 TOTAL SCORE: 5
5. BEING SO RESTLESS THAT IT IS HARD TO SIT STILL: NOT AT ALL
3. WORRYING TOO MUCH ABOUT DIFFERENT THINGS: SEVERAL DAYS
2. NOT BEING ABLE TO STOP OR CONTROL WORRYING: SEVERAL DAYS
7. FEELING AFRAID AS IF SOMETHING AWFUL MIGHT HAPPEN: SEVERAL DAYS
1. FEELING NERVOUS, ANXIOUS, OR ON EDGE: NOT AT ALL
6. BECOMING EASILY ANNOYED OR IRRITABLE: SEVERAL DAYS

## 2019-06-03 ASSESSMENT — PATIENT HEALTH QUESTIONNAIRE - PHQ9
5. POOR APPETITE OR OVEREATING: SEVERAL DAYS
SUM OF ALL RESPONSES TO PHQ QUESTIONS 1-9: 6

## 2019-06-03 ASSESSMENT — MIFFLIN-ST. JEOR: SCORE: 1552.37

## 2019-06-03 ASSESSMENT — PAIN SCALES - GENERAL: PAINLEVEL: NO PAIN (0)

## 2019-06-03 NOTE — PROGRESS NOTES
Subjective     Kayla Fuentes is a 48 year old female who presents to clinic today for the following health issues:    HPI   Hypertension Follow-up      Do you check your blood pressure regularly outside of the clinic? Yes     Are you following a low salt diet? No    Are your blood pressures ever more than 140 on the top number (systolic) OR more   than 90 on the bottom number (diastolic), for example 140/90? Yes    Amount of exercise or physical activity: 4-5 days/week for an average of 45-60 minutes    Problems taking medications regularly: No    Medication side effects: none    Diet: regular (no restrictions)      Migraine     Since your last clinic visit, how have your headaches changed?  Worsened    How often are you getting headaches or migraines?  Once a week    Are you able to do normal daily activities when you have a migraine?  Sometimes    Are you taking rescue/relief medications? (Select all that apply) sumatriptan (Imitrex)    How helpful is your rescue/relief medication?  I get total relief    Are you taking any medications to prevent migraines? (Select all that apply)  No, was taking Topamax and amitriptyline in the past    In the past 4 weeks, how often have you gone to urgent care or the emergency room because of your headaches?  0      Patient Active Problem List   Diagnosis     Hypertension goal BP (blood pressure) < 140/90     Intractable migraine with aura without status migrainosus     Chronic low back pain     TMJ (temporomandibular joint syndrome)     Obesity (BMI 30-39.9)     Microalbuminuria     Acute right flank pain     History of renal calculi     Elevated liver enzymes     Chronic bilateral low back pain without sciatica     Elevated fasting blood sugar     Status post lumbar spinal fusion     CARDIOVASCULAR SCREENING; LDL GOAL LESS THAN 160     Seasonal allergic rhinitis due to other allergic trigger     No past surgical history on file.    Social History     Tobacco Use     Smoking  status: Former Smoker     Last attempt to quit: 3/15/2015     Years since quittin.2     Smokeless tobacco: Never Used   Substance Use Topics     Alcohol use: Yes     No family history on file.      Current Outpatient Medications   Medication Sig Dispense Refill     Acetaminophen (TYLENOL ARTHRITIS PAIN PO) Take 1,300 mg by mouth 2 times daily       amLODIPine-benazepril (LOTREL) 5-10 MG capsule Take 1 capsule by mouth daily 90 capsule 3     Cyclobenzaprine HCl (FLEXERIL PO) Take 5 mg by mouth At Bedtime       dextromethorphan-guaiFENesin (MUCINEX DM)  MG per 12 hr tablet Take 1 tablet by mouth every 12 hours       DiphenhydrAMINE HCl (BENADRYL PO) Take 25 mg by mouth daily as needed for allergies       SUMAtriptan (IMITREX) 100 MG tablet Take 1 tablet (100 mg) by mouth at onset of headache for migraine May repeat in 2 hours. Max 2 tablets/24 hours. 9 tablet 3     topiramate (TOPAMAX) 25 MG tablet Start on one tablet at bedtime for week 1 followed by 2 tablets if tolerated from week 2 180 tablet 1     EPINEPHrine (EPIPEN/ADRENACLICK/OR ANY BX GENERIC EQUIV) 0.3 MG/0.3ML injection 2-pack INJECT THE CONTENTS OF 1 SYRINGE INTO THE MUSCLE ONCE AS NEEDED FOR ANAPHYLAXIS  2     GABAPENTIN PO Take 100 mg by mouth 3 times daily       OXYCODONE HCL PO Take 5 mg by mouth every 6 hours as needed       Allergies   Allergen Reactions     Sulfa Drugs GI Disturbance     Recent Labs   Lab Test 18  0803 10/18/17  1629 17  0729 16  1427 16  0738 14   A1C 5.6  --   --   --   --   --    *  --  183*  --  148* 156   HDL 55  --  47*  --  44* 50   TRIG 81  --  82  --  114 90   ALT 23  --  23 80*  --  11   CR 0.86 1.03 0.96 0.98 0.91 1.00   GFRESTIMATED 71 58* 63 61 67 69   GFRESTBLACK 86 70 76 74 81 80   POTASSIUM 4.0 3.7 4.1 4.4 4.0 4.0   TSH  --   --  0.88  --   --  2.15      BP Readings from Last 3 Encounters:   19 96/65   18 107/75   18 118/81    Wt Readings from Last 3  "Encounters:   06/03/19 89.3 kg (196 lb 12.8 oz)   11/26/18 88.6 kg (195 lb 4.8 oz)   11/05/18 87.6 kg (193 lb 1.6 oz)                      Reviewed and updated as needed this visit by Provider         Review of Systems   ROS COMP: CONSTITUTIONAL: NEGATIVE for fever, chills, change in weight  INTEGUMENTARY/SKIN: NEGATIVE for worrisome rashes, moles or lesions  EYES: NEGATIVE for vision changes or irritation  RESP: NEGATIVE for significant cough or SOB  CV: NEGATIVE for chest pain, palpitations or peripheral edema  CV: Hx HTN  GI: NEGATIVE for nausea, abdominal pain, heartburn, or change in bowel habits  MUSCULOSKELETAL: NEGATIVE for significant arthralgias or myalgia  NEURO: Hx headaches-migraine  ENDOCRINE: NEGATIVE for temperature intolerance, skin/hair changes  HEME/ALLERGY/IMMUNE: NEGATIVE for bleeding problems  PSYCHIATRIC: NEGATIVE for changes in mood or affect      Objective    BP 96/65 (BP Location: Right arm, Patient Position: Sitting, Cuff Size: Adult Large)   Pulse 71   Temp 98.5  F (36.9  C) (Oral)   Ht 1.689 m (5' 6.5\")   Wt 89.3 kg (196 lb 12.8 oz)   LMP 06/01/2019 (Exact Date)   SpO2 98%   BMI 31.29 kg/m    Body mass index is 31.29 kg/m .  Physical Exam   GENERAL: healthy, alert and no distress  RESP: lungs clear to auscultation - no rales, rhonchi or wheezes  CV: regular rate and rhythm, normal S1 S2, no S3 or S4, no murmur, click or rub, no peripheral edema and peripheral pulses strong  MS: no gross musculoskeletal defects noted, no edema  SKIN: no suspicious lesions or rashes  NEURO: Normal strength and tone, mentation intact and speech normal  PSYCH: mentation appears normal, affect normal/bright    Diagnostic Test Results:  Labs reviewed in Epic        Assessment & Plan     1. Hypertension goal BP (blood pressure) < 140/90  BP Readings from Last 6 Encounters:   06/03/19 96/65   11/26/18 107/75   11/05/18 118/81   10/28/18 117/75   05/24/18 116/84   05/16/18 108/70     Blood pressure is at " "goal, continue with current medications, recheck in 6 months at the time of physical or sooner if needed  Will follow low salt diet, weight loss and regular exercises.    - amLODIPine-benazepril (LOTREL) 5-10 MG capsule; Take 1 capsule by mouth daily  Dispense: 90 capsule; Refill: 3    2. Intractable migraine with aura without status migrainosus  Needing improvement with increasing frequency of headaches.  Recommend daily for migraine prophylaxis, continue to take Imitrex as needed for episodic migraine headaches, avoid potential triggers for migraine  Dosing and potential medication side effects discussed.  Patient verbalised understanding and is agreeable to the plan.    - SUMAtriptan (IMITREX) 100 MG tablet; Take 1 tablet (100 mg) by mouth at onset of headache for migraine May repeat in 2 hours. Max 2 tablets/24 hours.  Dispense: 9 tablet; Refill: 3  - topiramate (TOPAMAX) 25 MG tablet; Start on one tablet at bedtime for week 1 followed by 2 tablets if tolerated from week 2  Dispense: 180 tablet; Refill: 1    3. Breast cancer screening    - MA Screening Digital Bilateral; Future     BMI:   Estimated body mass index is 31.29 kg/m  as calculated from the following:    Height as of this encounter: 1.689 m (5' 6.5\").    Weight as of this encounter: 89.3 kg (196 lb 12.8 oz).   Weight management plan: Discussed healthy diet and exercise guidelines        Work on weight loss  Regular exercise  Chart documentation done in part with Dragon Voice recognition Software. Although reviewed after completion, some word and grammatical error may remain.    See Patient Instructions    No follow-ups on file.    Jassi Washington MD  [facility]      "

## 2019-06-03 NOTE — PATIENT INSTRUCTIONS
Schedule for mammogram  Schedule for fasting labs and physical in 11/2019    Start on TOPAMAX for migraine prophylaxis as prescribed

## 2019-06-04 ASSESSMENT — ANXIETY QUESTIONNAIRES: GAD7 TOTAL SCORE: 5

## 2019-06-05 NOTE — RESULT ENCOUNTER NOTE
Dear Kayla,  Your mammogram is negative and reassuring.   Let me know if you have any questions. Take care.  Jassi Washington MD

## 2019-11-22 DIAGNOSIS — G43.119 INTRACTABLE MIGRAINE WITH AURA WITHOUT STATUS MIGRAINOSUS: ICD-10-CM

## 2019-11-22 RX ORDER — TOPIRAMATE 25 MG/1
TABLET, FILM COATED ORAL
Qty: 60 TABLET | Refills: 0 | Status: SHIPPED | OUTPATIENT
Start: 2019-11-22 | End: 2019-12-27

## 2019-11-22 NOTE — LETTER
November 22, 2019      Kayla Fuentes  [id number] [address] [place] [address]              Dear Kayla,      We recently received a refill request from your pharmacy requesting a refill of : Topamax.     A review of your chart indicates that your last office visit was on 6/3.  An appointment is required every 6 months with your provider. We have authorized a one time 30 day refill of your medication to allow time for you to schedule.     Please call the clinic at [phone], or log in to your [employer] account at [email or web address] to schedule your appointment within that time frame so there is no delay in next month's refill.    Thank you for taking an active role in your healthcare.    Sincerely,          Jassi Washington MD    If you need assistance with your [employer] log in, please call [phone].

## 2019-11-22 NOTE — TELEPHONE ENCOUNTER
LOV- 6/3 says to return in 6 months for physical and labs. She is scheduled for labs on 12/3. Letter sent for physical.  Adelita Gomes RN

## 2019-11-24 DIAGNOSIS — I10 HYPERTENSION GOAL BP (BLOOD PRESSURE) < 140/90: ICD-10-CM

## 2019-11-24 DIAGNOSIS — Z13.1 SCREENING FOR DIABETES MELLITUS (DM): ICD-10-CM

## 2019-11-24 DIAGNOSIS — Z13.0 SCREENING FOR DEFICIENCY ANEMIA: ICD-10-CM

## 2019-11-24 DIAGNOSIS — Z00.00 ROUTINE GENERAL MEDICAL EXAMINATION AT A HEALTH CARE FACILITY: Primary | ICD-10-CM

## 2019-11-24 DIAGNOSIS — Z13.6 CARDIOVASCULAR SCREENING; LDL GOAL LESS THAN 160: ICD-10-CM

## 2019-12-06 ENCOUNTER — OFFICE VISIT (OUTPATIENT)
Dept: PEDIATRICS | Facility: CLINIC | Age: 49
End: 2019-12-06
Payer: COMMERCIAL

## 2019-12-06 VITALS
DIASTOLIC BLOOD PRESSURE: 74 MMHG | BODY MASS INDEX: 28.79 KG/M2 | WEIGHT: 183.4 LBS | OXYGEN SATURATION: 98 % | HEIGHT: 67 IN | SYSTOLIC BLOOD PRESSURE: 111 MMHG | TEMPERATURE: 98.6 F | HEART RATE: 73 BPM

## 2019-12-06 DIAGNOSIS — G43.119 INTRACTABLE MIGRAINE WITH AURA WITHOUT STATUS MIGRAINOSUS: ICD-10-CM

## 2019-12-06 DIAGNOSIS — R35.0 URINARY FREQUENCY: ICD-10-CM

## 2019-12-06 DIAGNOSIS — R80.9 MICROALBUMINURIA: ICD-10-CM

## 2019-12-06 DIAGNOSIS — Z12.4 CERVICAL CANCER SCREENING: ICD-10-CM

## 2019-12-06 DIAGNOSIS — I10 HYPERTENSION GOAL BP (BLOOD PRESSURE) < 140/90: ICD-10-CM

## 2019-12-06 DIAGNOSIS — Z13.1 SCREENING FOR DIABETES MELLITUS (DM): ICD-10-CM

## 2019-12-06 DIAGNOSIS — Z00.00 ROUTINE GENERAL MEDICAL EXAMINATION AT A HEALTH CARE FACILITY: ICD-10-CM

## 2019-12-06 DIAGNOSIS — Z13.6 CARDIOVASCULAR SCREENING; LDL GOAL LESS THAN 160: ICD-10-CM

## 2019-12-06 DIAGNOSIS — Z11.4 SCREENING FOR HUMAN IMMUNODEFICIENCY VIRUS: ICD-10-CM

## 2019-12-06 DIAGNOSIS — R73.01 ELEVATED FASTING BLOOD SUGAR: ICD-10-CM

## 2019-12-06 DIAGNOSIS — Z13.0 SCREENING FOR DEFICIENCY ANEMIA: ICD-10-CM

## 2019-12-06 DIAGNOSIS — Z12.39 BREAST CANCER SCREENING: ICD-10-CM

## 2019-12-06 DIAGNOSIS — Z00.00 ROUTINE GENERAL MEDICAL EXAMINATION AT A HEALTH CARE FACILITY: Primary | ICD-10-CM

## 2019-12-06 DIAGNOSIS — E66.3 OVERWEIGHT (BMI 25.0-29.9): ICD-10-CM

## 2019-12-06 LAB
ALBUMIN UR-MCNC: NEGATIVE MG/DL
ANION GAP SERPL CALCULATED.3IONS-SCNC: 4 MMOL/L (ref 3–14)
APPEARANCE UR: CLEAR
BASOPHILS # BLD AUTO: 0 10E9/L (ref 0–0.2)
BASOPHILS NFR BLD AUTO: 0.4 %
BILIRUB UR QL STRIP: NEGATIVE
BUN SERPL-MCNC: 12 MG/DL (ref 7–30)
CALCIUM SERPL-MCNC: 8.8 MG/DL (ref 8.5–10.1)
CHLORIDE SERPL-SCNC: 107 MMOL/L (ref 94–109)
CHOLEST SERPL-MCNC: 197 MG/DL
CO2 SERPL-SCNC: 26 MMOL/L (ref 20–32)
COLOR UR AUTO: ABNORMAL
CREAT SERPL-MCNC: 0.82 MG/DL (ref 0.52–1.04)
CREAT UR-MCNC: 31 MG/DL
DIFFERENTIAL METHOD BLD: NORMAL
EOSINOPHIL # BLD AUTO: 0.1 10E9/L (ref 0–0.7)
EOSINOPHIL NFR BLD AUTO: 1.2 %
ERYTHROCYTE [DISTWIDTH] IN BLOOD BY AUTOMATED COUNT: 13.1 % (ref 10–15)
GFR SERPL CREATININE-BSD FRML MDRD: 84 ML/MIN/{1.73_M2}
GLUCOSE SERPL-MCNC: 82 MG/DL (ref 70–99)
GLUCOSE UR STRIP-MCNC: NEGATIVE MG/DL
HCT VFR BLD AUTO: 40.7 % (ref 35–47)
HDLC SERPL-MCNC: 51 MG/DL
HGB BLD-MCNC: 13.2 G/DL (ref 11.7–15.7)
HGB UR QL STRIP: NEGATIVE
IMM GRANULOCYTES # BLD: 0 10E9/L (ref 0–0.4)
IMM GRANULOCYTES NFR BLD: 0.3 %
KETONES UR STRIP-MCNC: 5 MG/DL
LDLC SERPL CALC-MCNC: 134 MG/DL
LEUKOCYTE ESTERASE UR QL STRIP: NEGATIVE
LYMPHOCYTES # BLD AUTO: 2.3 10E9/L (ref 0.8–5.3)
LYMPHOCYTES NFR BLD AUTO: 33.7 %
MCH RBC QN AUTO: 30.3 PG (ref 26.5–33)
MCHC RBC AUTO-ENTMCNC: 32.4 G/DL (ref 31.5–36.5)
MCV RBC AUTO: 94 FL (ref 78–100)
MICROALBUMIN UR-MCNC: 7 MG/L
MICROALBUMIN/CREAT UR: 22.02 MG/G CR (ref 0–25)
MONOCYTES # BLD AUTO: 0.6 10E9/L (ref 0–1.3)
MONOCYTES NFR BLD AUTO: 9 %
NEUTROPHILS # BLD AUTO: 3.8 10E9/L (ref 1.6–8.3)
NEUTROPHILS NFR BLD AUTO: 55.4 %
NITRATE UR QL: NEGATIVE
NONHDLC SERPL-MCNC: 146 MG/DL
PH UR STRIP: 5.5 PH (ref 5–7)
PLATELET # BLD AUTO: 256 10E9/L (ref 150–450)
POTASSIUM SERPL-SCNC: 3.8 MMOL/L (ref 3.4–5.3)
RBC # BLD AUTO: 4.35 10E12/L (ref 3.8–5.2)
SODIUM SERPL-SCNC: 137 MMOL/L (ref 133–144)
SOURCE: ABNORMAL
SP GR UR STRIP: 1 (ref 1–1.03)
TRIGL SERPL-MCNC: 59 MG/DL
UROBILINOGEN UR STRIP-MCNC: NORMAL MG/DL (ref 0–2)
WBC # BLD AUTO: 6.9 10E9/L (ref 4–11)

## 2019-12-06 PROCEDURE — 80061 LIPID PANEL: CPT | Performed by: FAMILY MEDICINE

## 2019-12-06 PROCEDURE — 85025 COMPLETE CBC W/AUTO DIFF WBC: CPT | Performed by: FAMILY MEDICINE

## 2019-12-06 PROCEDURE — 82043 UR ALBUMIN QUANTITATIVE: CPT | Performed by: FAMILY MEDICINE

## 2019-12-06 PROCEDURE — 81003 URINALYSIS AUTO W/O SCOPE: CPT | Performed by: FAMILY MEDICINE

## 2019-12-06 PROCEDURE — 80048 BASIC METABOLIC PNL TOTAL CA: CPT | Performed by: FAMILY MEDICINE

## 2019-12-06 PROCEDURE — 36415 COLL VENOUS BLD VENIPUNCTURE: CPT | Performed by: FAMILY MEDICINE

## 2019-12-06 PROCEDURE — 99396 PREV VISIT EST AGE 40-64: CPT | Performed by: FAMILY MEDICINE

## 2019-12-06 PROCEDURE — 87389 HIV-1 AG W/HIV-1&-2 AB AG IA: CPT | Performed by: FAMILY MEDICINE

## 2019-12-06 RX ORDER — AMLODIPINE AND BENAZEPRIL HYDROCHLORIDE 5; 10 MG/1; MG/1
1 CAPSULE ORAL DAILY
Qty: 90 CAPSULE | Refills: 3 | Status: SHIPPED | OUTPATIENT
Start: 2019-12-06 | End: 2020-12-21

## 2019-12-06 RX ORDER — SUMATRIPTAN 100 MG/1
100 TABLET, FILM COATED ORAL
Qty: 9 TABLET | Refills: 3 | Status: SHIPPED | OUTPATIENT
Start: 2019-12-06

## 2019-12-06 RX ORDER — TOPIRAMATE 50 MG/1
50 TABLET, FILM COATED ORAL AT BEDTIME
Qty: 90 TABLET | Refills: 3 | Status: SHIPPED | OUTPATIENT
Start: 2019-12-06 | End: 2020-12-21

## 2019-12-06 ASSESSMENT — MIFFLIN-ST. JEOR: SCORE: 1486.59

## 2019-12-06 NOTE — PATIENT INSTRUCTIONS
Schedule for mammogram, recheck in 6 months  Get the urine exam today      Preventive Health Recommendations  Female Ages 40 to 49    Yearly exam:     See your health care provider every year in order to  1. Review health changes.   2. Discuss preventive care.    3. Review your medicines if your doctor prescribed any.      Get a Pap test every three years (unless you have an abnormal result and your provider advises testing more often).      If you get Pap tests with HPV test, you only need to test every 5 years, unless you have an abnormal result. You do not need a Pap test if your uterus was removed (hysterectomy) and you have not had cancer.      You should be tested each year for STDs (sexually transmitted diseases), if you're at risk.     Ask your doctor if you should have a mammogram.      Have a colonoscopy (test for colon cancer) if someone in your family has had colon cancer or polyps before age 50.       Have a cholesterol test every 5 years.       Have a diabetes test (fasting glucose) after age 45. If you are at risk for diabetes, you should have this test every 3 years.    Shots: Get a flu shot each year. Get a tetanus shot every 10 years.     Nutrition:     Eat at least 5 servings of fruits and vegetables each day.    Eat whole-grain bread, whole-wheat pasta and brown rice instead of white grains and rice.    Get adequate Calcium and Vitamin D.      Lifestyle    Exercise at least 150 minutes a week (an average of 30 minutes a day, 5 days a week). This will help you control your weight and prevent disease.    Limit alcohol to one drink per day.    No smoking.     Wear sunscreen to prevent skin cancer.    See your dentist every six months for an exam and cleaning.

## 2019-12-06 NOTE — PROGRESS NOTES
SUBJECTIVE:   CC: Kayla Fuentes is an 49 year old woman who presents for preventive health visit.     Healthy Habits:    Do you get at least three servings of calcium containing foods daily (dairy, green leafy vegetables, etc.)? yes    Amount of exercise or daily activities, outside of work: walking about 11,000 steps    Problems taking medications regularly No    Medication side effects: No    Have you had an eye exam in the past two years? yes    Do you see a dentist twice per year? yes    Do you have sleep apnea, excessive snoring or daytime drowsiness?no      Patient has some concerns about her kidneys. Father had kidney failure     Today's PHQ-2 Score:   PHQ-2 (  [employer]) 2019   Q1: Little interest or pleasure in doing things 0 0   Q2: Feeling down, depressed or hopeless 0 0   PHQ-2 Score 0 0       Abuse: Current or Past(Physical, Sexual or Emotional)- No  Do you feel safe in your environment? No        Social History     Tobacco Use     Smoking status: Former Smoker     Last attempt to quit: 3/15/2015     Years since quittin.7     Smokeless tobacco: Never Used   Substance Use Topics     Alcohol use: Yes     If you drink alcohol do you typically have >3 drinks per day or >7 drinks per week? No                     Reviewed orders with patient.  Reviewed health maintenance and updated orders accordingly - Yes  Lab work is in process  Labs reviewed in EPIC  BP Readings from Last 3 Encounters:   19 111/74   19 96/65   18 107/75    Wt Readings from Last 3 Encounters:   19 83.2 kg (183 lb 6.4 oz)   19 89.3 kg (196 lb 12.8 oz)   18 88.6 kg (195 lb 4.8 oz)                  Patient Active Problem List   Diagnosis     Hypertension goal BP (blood pressure) < 140/90     Intractable migraine with aura without status migrainosus     Chronic low back pain     TMJ (temporomandibular joint syndrome)     Obesity (BMI 30-39.9)     Microalbuminuria     Acute right flank  pain     History of renal calculi     Elevated liver enzymes     Chronic bilateral low back pain without sciatica     Elevated fasting blood sugar     Status post lumbar spinal fusion     CARDIOVASCULAR SCREENING; LDL GOAL LESS THAN 160     Seasonal allergic rhinitis due to other allergic trigger     Overweight (BMI 25.0-29.9)     No past surgical history on file.    Social History     Tobacco Use     Smoking status: Former Smoker     Last attempt to quit: 3/15/2015     Years since quittin.7     Smokeless tobacco: Never Used   Substance Use Topics     Alcohol use: Yes     No family history on file.      Current Outpatient Medications   Medication Sig Dispense Refill     amLODIPine-benazepril (LOTREL) 5-10 MG capsule Take 1 capsule by mouth daily 90 capsule 3     EPINEPHrine (EPIPEN/ADRENACLICK/OR ANY BX GENERIC EQUIV) 0.3 MG/0.3ML injection 2-pack INJECT THE CONTENTS OF 1 SYRINGE INTO THE MUSCLE ONCE AS NEEDED FOR ANAPHYLAXIS  2     SUMAtriptan (IMITREX) 100 MG tablet Take 1 tablet (100 mg) by mouth at onset of headache for migraine May repeat in 2 hours. Max 2 tablets/24 hours. 9 tablet 3     topiramate (TOPAMAX) 25 MG tablet TAKE 1 TABLET BY MOUTH AT BEDTIME X1WEEK FOLLOWED BY 2 TABLETS FOR WEEK 2 IF TOLERATED 60 tablet 0     topiramate (TOPAMAX) 50 MG tablet Take 1 tablet (50 mg) by mouth At Bedtime 90 tablet 3     Acetaminophen (TYLENOL ARTHRITIS PAIN PO) Take 1,300 mg by mouth 2 times daily       Cyclobenzaprine HCl (FLEXERIL PO) Take 5 mg by mouth At Bedtime       dextromethorphan-guaiFENesin (MUCINEX DM)  MG per 12 hr tablet Take 1 tablet by mouth every 12 hours       DiphenhydrAMINE HCl (BENADRYL PO) Take 25 mg by mouth daily as needed for allergies       GABAPENTIN PO Take 100 mg by mouth 3 times daily       OXYCODONE HCL PO Take 5 mg by mouth every 6 hours as needed       Allergies   Allergen Reactions     Sulfa Drugs GI Disturbance     Recent Labs   Lab Test 19  1040 18  0803   08/08/17  0729 11/17/16  1427  02/27/14   A1C  --  5.6  --   --   --   --   --    * 159*  --  183*  --    < > 156   HDL 51 55  --  47*  --    < > 50   TRIG 59 81  --  82  --    < > 90   ALT  --  23  --  23 80*  --  11   CR 0.82 0.86   < > 0.96 0.98   < > 1.00   GFRESTIMATED 84 71   < > 63 61   < > 69   GFRESTBLACK >90 86   < > 76 74   < > 80   POTASSIUM 3.8 4.0   < > 4.1 4.4   < > 4.0   TSH  --   --   --  0.88  --   --  2.15    < > = values in this interval not displayed.        Mammogram Screening: Patient under age 50, mutual decision reflected in health maintenance.      Pertinent mammograms are reviewed under the imaging tab.  History of abnormal Pap smear: NO - age 30-65 PAP every 5 years with negative HPV co-testing recommended     Reviewed and updated as needed this visit by clinical staff  Tobacco  Allergies  Meds         Reviewed and updated as needed this visit by Provider          No past medical history on file.   No past surgical history on file.  OB History   No obstetric history on file.       ROS:  CONSTITUTIONAL: NEGATIVE for fever, chills, change in weight  INTEGUMENTARU/SKIN: NEGATIVE for worrisome rashes, moles or lesions  EYES: NEGATIVE for vision changes or irritation  ENT: NEGATIVE for ear, mouth and throat problems  RESP: NEGATIVE for significant cough or SOB  BREAST: NEGATIVE for masses, tenderness or discharge  CV: NEGATIVE for chest pain, palpitations or peripheral edema  CV: Hx HTN  GI: NEGATIVE for nausea, abdominal pain, heartburn, or change in bowel habits  : NEGATIVE for unusual urinary or vaginal symptoms. Periods are regular.  MUSCULOSKELETAL: NEGATIVE for significant arthralgias or myalgia  NEURO: NEGATIVE for weakness, dizziness or paresthesias  NEURO: Hx headaches-migraine  ENDOCRINE: NEGATIVE for temperature intolerance, skin/hair changes  HEME/ALLERGY/IMMUNE: NEGATIVE for bleeding problems  PSYCHIATRIC: NEGATIVE for changes in mood or affect    OBJECTIVE:   BP  "111/74 (BP Location: Right arm, Patient Position: Chair, Cuff Size: Adult Regular)   Pulse 73   Temp 98.6  F (37  C) (Temporal)   Ht 1.689 m (5' 6.5\")   Wt 83.2 kg (183 lb 6.4 oz)   SpO2 98%   BMI 29.16 kg/m    EXAM:  GENERAL: healthy, alert and no distress  EYES: Eyes grossly normal to inspection, PERRL and conjunctivae and sclerae normal  HENT: ear canals and TM's normal, nose and mouth without ulcers or lesions  NECK: no adenopathy, no asymmetry, masses, or scars and thyroid normal to palpation  RESP: lungs clear to auscultation - no rales, rhonchi or wheezes  BREAST: normal without masses, tenderness or nipple discharge and no palpable axillary masses or adenopathy  CV: regular rate and rhythm, normal S1 S2, no S3 or S4, no murmur, click or rub, no peripheral edema and peripheral pulses strong  ABDOMEN: soft, nontender, no hepatosplenomegaly, no masses and bowel sounds normal   (female): Deferred, patient sees OB/GYN  MS: no gross musculoskeletal defects noted, no edema  SKIN: no suspicious lesions or rashes  NEURO: Normal strength and tone, mentation intact and speech normal  PSYCH: mentation appears normal, affect normal/bright    Diagnostic Test Results:  Labs reviewed in Epic  Results for orders placed or performed in visit on 12/06/19 (from the past 24 hour(s))   *UA reflex to Microscopic and Culture ([place]; [facility]; [facility]; [facility]; [facility]; [facility]; [place]; [place])   Result Value Ref Range    Color Urine Straw     Appearance Urine Clear     Glucose Urine Negative NEG^Negative mg/dL    Bilirubin Urine Negative NEG^Negative    Ketones Urine 5 (A) NEG^Negative mg/dL    Specific Gravity Urine 1.004 1.003 - 1.035    Blood Urine Negative NEG^Negative    pH Urine 5.5 5.0 - 7.0 pH    Protein Albumin Urine Negative NEG^Negative mg/dL    Urobilinogen mg/dL Normal 0.0 - 2.0 mg/dL    Nitrite Urine Negative NEG^Negative    Leukocyte Esterase Urine Negative NEG^Negative "    Source Midstream Urine        ASSESSMENT/PLAN:   1. Routine general medical examination at a health care facility  Discussed on regular exercises, daily calcium intake, healthy eating, self breast exams monthly and routine dental checks    - MA Screening Digital Bilateral; Future  - HIV Antigen Antibody Combo    2. Hypertension goal BP (blood pressure) < 140/90  BP Readings from Last 6 Encounters:   12/06/19 111/74   06/03/19 96/65   11/26/18 107/75   11/05/18 118/81   10/28/18 117/75   05/24/18 116/84     Blood pressure is at goal, continue with current medication, low-salt diet, regular exercises  - amLODIPine-benazepril (LOTREL) 5-10 MG capsule; Take 1 capsule by mouth daily  Dispense: 90 capsule; Refill: 3    3. Intractable migraine with aura without status migrainosus  Stable, continue with current medications  Topamax has helped in preventing the frequency of migraine,  Recheck in 6 months.  - SUMAtriptan (IMITREX) 100 MG tablet; Take 1 tablet (100 mg) by mouth at onset of headache for migraine May repeat in 2 hours. Max 2 tablets/24 hours.  Dispense: 9 tablet; Refill: 3  - topiramate (TOPAMAX) 50 MG tablet; Take 1 tablet (50 mg) by mouth At Bedtime  Dispense: 90 tablet; Refill: 3    4. Breast cancer screening    - MA Screening Digital Bilateral; Future    5. CARDIOVASCULAR SCREENING; LDL GOAL LESS THAN 160  LDL Cholesterol Calculated   Date Value Ref Range Status   12/06/2019 134 (H) <100 mg/dL Final     Comment:     Above desirable:  100-129 mg/dl  Borderline High:  130-159 mg/dL  High:             160-189 mg/dL  Very high:       >189 mg/dl           6. Cervical cancer screening  Patient is not due for the Pap, she sees OB/GYN clinic [facility]    7. Urinary frequency  Has been having increasing urinary frequency for the past 6 weeks with no other concerns for UTI symptoms, hematuria will get a urine exam today-this was normal with small amount of  Ketones  Hydrate well, monitor  - *UA reflex to Microscopic  and Culture ([place]; [facility]; [facility]; [facility]; [facility]; [facility]; [place]; [place])    8. Screening for human immunodeficiency virus    - HIV Antigen Antibody Combo    9. Overweight (BMI 25.0-29.9)  Wt Readings from Last 5 Encounters:   19 83.2 kg (183 lb 6.4 oz)   19 89.3 kg (196 lb 12.8 oz)   18 88.6 kg (195 lb 4.8 oz)   18 87.6 kg (193 lb 1.6 oz)   10/28/18 87.5 kg (193 lb)     Emphasized on weight loss, portion control, low calorie and low fat diet, healthy eating, regular exercises.      10. Elevated fasting blood sugar  Glucose   Date Value Ref [place] Status   2019 82 70 - 99 mg/dL Final     Comment:     Fasting specimen   2018 102 (H) 70 - 99 mg/dL Final     Comment:     Fasting specimen   10/18/2017 81 70 - 99 mg/dL Final     Comment:     Non Fasting   2017 105 (H) 70 - 99 mg/dL Final     Comment:     Fasting specimen   2016 92 70 - 99 mg/dL Final         Negative test results reviewed with the patient and reassured.      11. Microalbuminuria  Lab Results   Component Value Date    MICROL 7 2019     No results found for: MICROALBUMIN  Continue current blood pressure medication, monitor      COUNSELING:   Reviewed preventive health counseling, as reflected in patient instructions  Special attention given to:        Regular exercise       Healthy diet/nutrition       Vision screening       Contraception       Family planning       Folic Acid Counseling       Osteoporosis Prevention/Bone Health       Safe sex practices/STD prevention       HIV screeninx in teen years, 1x in adult years, and at intervals if high risk       The 10-year ASCVD risk score (Chana TASIA JrMaxwell, et al., 2013) is: 1.8%    Values used to calculate the score:      Age: 49 years      Sex: Female      Is Non- : Yes      Diabetic: No      Tobacco smoker: No      Systolic Blood Pressure: 111 mmHg      Is BP treated: Yes      HDL  "Cholesterol: 51 mg/dL      Total Cholesterol: 197 mg/dL    Estimated body mass index is 29.16 kg/m  as calculated from the following:    Height as of this encounter: 1.689 m (5' 6.5\").    Weight as of this encounter: 83.2 kg (183 lb 6.4 oz).    Weight management plan: Discussed healthy diet and exercise guidelines     reports that she quit smoking about 4 years ago. She has never used smokeless tobacco.      Counseling Resources:  ATP IV Guidelines  Pooled Cohorts Equation Calculator  Breast Cancer Risk Calculator  FRAX Risk Assessment  ICSI Preventive Guidelines  Dietary Guidelines for Americans, 2010  USDA's MyPlate  ASA Prophylaxis  Lung CA Screening    Jassi Washington MD  [facility]  Chart documentation done in part with Dragon Voice recognition Software. Although reviewed after completion, some word and grammatical error may remain.    "

## 2019-12-06 NOTE — RESULT ENCOUNTER NOTE
Dear Kayla,  Your urine exam showed no concerns for infection, this is good.  If your symptoms get worse, please follow-up or call for further recommendations.  Let me know if you have any questions. Take care.  Jassi Washington MD

## 2019-12-07 LAB — HIV 1+2 AB+HIV1 P24 AG SERPL QL IA: NONREACTIVE

## 2019-12-09 NOTE — RESULT ENCOUNTER NOTE
Dear Kayla,  Your test results for HIV screening is negative, this is good and reassuring.   Let me know if you have any questions. Take care.  Jassi Washington MD

## 2019-12-27 DIAGNOSIS — G43.119 INTRACTABLE MIGRAINE WITH AURA WITHOUT STATUS MIGRAINOSUS: ICD-10-CM

## 2019-12-27 RX ORDER — TOPIRAMATE 25 MG/1
TABLET, FILM COATED ORAL
Qty: 180 TABLET | Refills: 1 | Status: SHIPPED | OUTPATIENT
Start: 2019-12-27

## 2019-12-27 NOTE — TELEPHONE ENCOUNTER
LOV- 12/6 says recheck in 6 months. Prescription approved per refill protocol.    Adelita Gomes RN

## 2020-03-10 ENCOUNTER — HEALTH MAINTENANCE LETTER (OUTPATIENT)
Age: 50
End: 2020-03-10

## 2020-07-15 ENCOUNTER — ANCILLARY PROCEDURE (OUTPATIENT)
Dept: MAMMOGRAPHY | Facility: CLINIC | Age: 50
End: 2020-07-15
Attending: FAMILY MEDICINE
Payer: COMMERCIAL

## 2020-07-15 DIAGNOSIS — Z12.39 BREAST CANCER SCREENING: ICD-10-CM

## 2020-07-15 DIAGNOSIS — Z00.00 ROUTINE GENERAL MEDICAL EXAMINATION AT A HEALTH CARE FACILITY: ICD-10-CM

## 2020-07-15 PROCEDURE — 77067 SCR MAMMO BI INCL CAD: CPT | Performed by: STUDENT IN AN ORGANIZED HEALTH CARE EDUCATION/TRAINING PROGRAM

## 2020-07-15 PROCEDURE — 77063 BREAST TOMOSYNTHESIS BI: CPT | Mod: GC | Performed by: STUDENT IN AN ORGANIZED HEALTH CARE EDUCATION/TRAINING PROGRAM

## 2020-12-17 DIAGNOSIS — G43.119 INTRACTABLE MIGRAINE WITH AURA WITHOUT STATUS MIGRAINOSUS: ICD-10-CM

## 2020-12-17 DIAGNOSIS — I10 HYPERTENSION GOAL BP (BLOOD PRESSURE) < 140/90: ICD-10-CM

## 2020-12-21 RX ORDER — TOPIRAMATE 50 MG/1
50 TABLET, FILM COATED ORAL AT BEDTIME
Qty: 90 TABLET | Refills: 0 | Status: SHIPPED | OUTPATIENT
Start: 2020-12-21

## 2020-12-21 RX ORDER — AMLODIPINE AND BENAZEPRIL HYDROCHLORIDE 5; 10 MG/1; MG/1
1 CAPSULE ORAL DAILY
Qty: 90 CAPSULE | Refills: 0 | Status: SHIPPED | OUTPATIENT
Start: 2020-12-21

## 2020-12-21 NOTE — TELEPHONE ENCOUNTER
Last Clinic Visit: 12/6/2019  [facility]    Appointment past due: dario refill 90 days and staff message sent to [facility] scheduling.    Lotrel:  Lab(s)- Creatinine past due.

## 2020-12-22 NOTE — TELEPHONE ENCOUNTER
Spoke with patient.  She is living in [place] and seeing Dr Washington when she comes back to visit her sister (who is immune compromised).  She is hoping to come back to visit her in March and will schedule visits to see her then.    Caron Telles  Primary Care   [facility] [place]

## 2020-12-27 ENCOUNTER — HEALTH MAINTENANCE LETTER (OUTPATIENT)
Age: 50
End: 2020-12-27

## 2021-01-15 ENCOUNTER — HEALTH MAINTENANCE LETTER (OUTPATIENT)
Age: 51
End: 2021-01-15

## 2021-10-03 ENCOUNTER — HEALTH MAINTENANCE LETTER (OUTPATIENT)
Age: 51
End: 2021-10-03

## 2022-01-23 ENCOUNTER — HEALTH MAINTENANCE LETTER (OUTPATIENT)
Age: 52
End: 2022-01-23

## 2022-09-11 ENCOUNTER — HEALTH MAINTENANCE LETTER (OUTPATIENT)
Age: 52
End: 2022-09-11

## 2023-01-22 ENCOUNTER — HEALTH MAINTENANCE LETTER (OUTPATIENT)
Age: 53
End: 2023-01-22

## 2023-04-30 ENCOUNTER — HEALTH MAINTENANCE LETTER (OUTPATIENT)
Age: 53
End: 2023-04-30

## 2024-02-18 ENCOUNTER — HEALTH MAINTENANCE LETTER (OUTPATIENT)
Age: 54
End: 2024-02-18